# Patient Record
Sex: FEMALE | Race: WHITE | Employment: FULL TIME | URBAN - METROPOLITAN AREA
[De-identification: names, ages, dates, MRNs, and addresses within clinical notes are randomized per-mention and may not be internally consistent; named-entity substitution may affect disease eponyms.]

---

## 2017-01-30 DIAGNOSIS — F33.1 MAJOR DEPRESSIVE DISORDER, RECURRENT EPISODE, MODERATE (H): Primary | ICD-10-CM

## 2017-01-30 RX ORDER — BUPROPION HYDROCHLORIDE 100 MG/1
100 TABLET, EXTENDED RELEASE ORAL 2 TIMES DAILY
Qty: 60 TABLET | Refills: 0 | Status: SHIPPED | OUTPATIENT
Start: 2017-01-30 | End: 2017-02-28

## 2017-01-30 NOTE — Clinical Note
[facility]                                             [address] [place]  [address]    January 30, 2017    Narcisa Quiles  [address]  [facility] [address]    Dear Narcisa,       We recently received a refill request for Buproprion abd Fluoxetine.  We have refilled this for a one time 30 day supply only because you are due for a:    Depression office visit      Please call at your earliest convenience so that there will not be a delay with your future refills.          Thank you,   Your [facility] Care Team/  [phone]

## 2017-03-03 ENCOUNTER — OFFICE VISIT (OUTPATIENT)
Dept: FAMILY MEDICINE | Facility: CLINIC | Age: 52
End: 2017-03-03
Payer: COMMERCIAL

## 2017-03-03 VITALS
BODY MASS INDEX: 38.73 KG/M2 | HEART RATE: 96 BPM | HEIGHT: 66 IN | WEIGHT: 241 LBS | DIASTOLIC BLOOD PRESSURE: 82 MMHG | SYSTOLIC BLOOD PRESSURE: 124 MMHG | OXYGEN SATURATION: 98 %

## 2017-03-03 DIAGNOSIS — Z79.890 SURGICAL MENOPAUSE ON HORMONE REPLACEMENT THERAPY: ICD-10-CM

## 2017-03-03 DIAGNOSIS — F33.1 MAJOR DEPRESSIVE DISORDER, RECURRENT EPISODE, MODERATE (H): ICD-10-CM

## 2017-03-03 DIAGNOSIS — E89.40 SURGICAL MENOPAUSE ON HORMONE REPLACEMENT THERAPY: ICD-10-CM

## 2017-03-03 DIAGNOSIS — Z23 NEED FOR VACCINATION: ICD-10-CM

## 2017-03-03 DIAGNOSIS — Z00.00 ROUTINE GENERAL MEDICAL EXAMINATION AT A HEALTH CARE FACILITY: Primary | ICD-10-CM

## 2017-03-03 DIAGNOSIS — Z90.710 ACQUIRED ABSENCE OF BOTH CERVIX AND UTERUS: ICD-10-CM

## 2017-03-03 PROCEDURE — 90715 TDAP VACCINE 7 YRS/> IM: CPT | Performed by: PHYSICIAN ASSISTANT

## 2017-03-03 PROCEDURE — 90471 IMMUNIZATION ADMIN: CPT | Performed by: PHYSICIAN ASSISTANT

## 2017-03-03 PROCEDURE — 99396 PREV VISIT EST AGE 40-64: CPT | Mod: 25 | Performed by: PHYSICIAN ASSISTANT

## 2017-03-03 RX ORDER — FLUOXETINE 10 MG/1
10 CAPSULE ORAL 2 TIMES DAILY
Qty: 180 CAPSULE | Refills: 1 | Status: SHIPPED | OUTPATIENT
Start: 2017-03-03 | End: 2017-03-03

## 2017-03-03 RX ORDER — BUPROPION HYDROCHLORIDE 100 MG/1
100 TABLET, EXTENDED RELEASE ORAL 2 TIMES DAILY
Qty: 180 TABLET | Refills: 1 | Status: SHIPPED | OUTPATIENT
Start: 2017-03-03 | End: 2017-08-08

## 2017-03-03 RX ORDER — ESTRADIOL 0.5 MG/1
0.5 TABLET ORAL DAILY
Qty: 90 TABLET | Refills: 3 | Status: SHIPPED | OUTPATIENT
Start: 2017-03-03 | End: 2017-08-08

## 2017-03-03 ASSESSMENT — ANXIETY QUESTIONNAIRES
3. WORRYING TOO MUCH ABOUT DIFFERENT THINGS: NOT AT ALL
IF YOU CHECKED OFF ANY PROBLEMS ON THIS QUESTIONNAIRE, HOW DIFFICULT HAVE THESE PROBLEMS MADE IT FOR YOU TO DO YOUR WORK, TAKE CARE OF THINGS AT HOME, OR GET ALONG WITH OTHER PEOPLE: NOT DIFFICULT AT ALL
5. BEING SO RESTLESS THAT IT IS HARD TO SIT STILL: NOT AT ALL
2. NOT BEING ABLE TO STOP OR CONTROL WORRYING: NOT AT ALL
1. FEELING NERVOUS, ANXIOUS, OR ON EDGE: NOT AT ALL
GAD7 TOTAL SCORE: 1
6. BECOMING EASILY ANNOYED OR IRRITABLE: SEVERAL DAYS
7. FEELING AFRAID AS IF SOMETHING AWFUL MIGHT HAPPEN: NOT AT ALL

## 2017-03-03 ASSESSMENT — PATIENT HEALTH QUESTIONNAIRE - PHQ9: 5. POOR APPETITE OR OVEREATING: NOT AT ALL

## 2017-03-03 NOTE — NURSING NOTE
Screening Questionnaire for Adult Immunization    Are you sick today?   No   Do you have allergies to medications, food, a vaccine component or latex?   No   Have you ever had a serious reaction after receiving a vaccination?   No   Do you have a long-term health problem with heart disease, lung disease, asthma, kidney disease, metabolic disease (e.g. diabetes), anemia, or other blood disorder?   No   Do you have cancer, leukemia, HIV/AIDS, or any other immune system problem?   No   In the past 3 months, have you taken medications that affect  your immune system, such as prednisone, other steroids, or anticancer drugs; drugs for the treatment of rheumatoid arthritis, Crohn s disease, or psoriasis; or have you had radiation treatments?   No   Have you had a seizure, or a brain or other nervous system problem?   No   During the past year, have you received a transfusion of blood or blood     products, or been given immune (gamma) globulin or antiviral drug?   No   For women: Are you pregnant or is there a chance you could become        pregnant during the next month?   No   Have you received any vaccinations in the past 4 weeks?   No     Immunization questionnaire answers were all negative.      MNVFC doesn't apply on this patient    Per orders of ADO, injection of Tdap given by Flor Thomas. Patient instructed to remain in clinic for 20 minutes afterwards, and to report any adverse reaction to me immediately.       Screening performed by Flor Thomas on 3/3/2017 at 1:42 PM.

## 2017-03-03 NOTE — MR AVS SNAPSHOT
After Visit Summary   3/3/2017    Narcisa Quiles    MRN: [id number]           Patient Information     Date Of Birth          1965        Visit Information        Provider Department      3/3/2017 1:10 PM Pedro Mohmaud PA-C [facility]        Today's Diagnoses     Routine general medical examination at a health care facility    -  1    Major depressive disorder, recurrent episode, moderate (H)        Acquired absence of both cervix and uterus        Surgical menopause on hormone replacement therapy          Care Instructions      Preventive Health Recommendations  Female Ages 50 - 64    Yearly exam: See your health care provider every year in order to  o Review health changes.   o Discuss preventive care.    o Review your medicines if your doctor has prescribed any.      Get a Pap test every three years (unless you have an abnormal result and your provider advises testing more often).    If you get Pap tests with HPV test, you only need to test every 5 years, unless you have an abnormal result.     You do not need a Pap test if your uterus was removed (hysterectomy) and you have not had cancer.    You should be tested each year for STDs (sexually transmitted diseases) if you're at risk.     Have a mammogram every 1 to 2 years.    Have a colonoscopy at age 50, or have a yearly FIT test (stool test). These exams screen for colon cancer.      Have a cholesterol test every 5 years, or more often if advised.    Have a diabetes test (fasting glucose) every three years. If you are at risk for diabetes, you should have this test more often.     If you are at risk for osteoporosis (brittle bone disease), think about having a bone density scan (DEXA).    Shots: Get a flu shot each year. Get a tetanus shot every 10 years.    Nutrition:     Eat at least 5 servings of fruits and vegetables each day.    Eat whole-grain bread, whole-wheat pasta and brown rice instead of white grains and  rice.    Talk to your provider about Calcium and Vitamin D.     Lifestyle    Exercise at least 150 minutes a week (30 minutes a day, 5 days a week). This will help you control your weight and prevent disease.    Limit alcohol to one drink per day.    No smoking.     Wear sunscreen to prevent skin cancer.     See your dentist every six months for an exam and cleaning.    See your eye doctor every 1 to 2 years.          Follow-ups after your visit        Future tests that were ordered for you today     Open Future Orders        Priority Expected Expires Ordered    Lipid panel reflex to direct LDL Routine  9/4/2017 3/3/2017    Basic metabolic panel Routine  9/4/2017 3/3/2017            Who to contact     If you have questions or need follow up information about today's clinic visit or your schedule please contact [facility] directly at [phone].  Normal or non-critical lab and imaging results will be communicated to you by Socialscopehart, letter or phone within 4 business days after the clinic has received the results. If you do not hear from us within 7 days, please contact the clinic through Socialscopehart or phone. If you have a critical or abnormal lab result, we will notify you by phone as soon as possible.  Submit refill requests through QR Artist or call your pharmacy and they will forward the refill request to us. Please allow 3 business days for your refill to be completed.          Additional Information About Your Visit        QR Artist Information     QR Artist gives you secure access to your electronic health record. If you see a primary care provider, you can also send messages to your care team and make appointments. If you have questions, please call your primary care clinic.  If you do not have a primary care provider, please call [phone] and they will assist you.        Care EveryWhere ID     This is your Care EveryWhere ID. This could be used by other organizations to access your [facility]  "records  [id number]        Your Vitals Were     Pulse Height Pulse Oximetry BMI (Body Mass Index)          96 5' 5.5\" (1.664 m) 98% 39.49 kg/m2         Blood Pressure from Last 3 Encounters:   03/03/17 124/82   08/12/16 110/80   08/03/16 108/74    Weight from Last 3 Encounters:   03/03/17 241 lb (109.3 kg)   10/12/16 238 lb (108 kg)   09/23/16 243 lb (110.2 kg)                 Today's Medication Changes          These changes are accurate as of: 3/3/17  1:39 PM.  If you have any questions, ask your nurse or doctor.               These medicines have changed or have updated prescriptions.        Dose/Directions    FLUoxetine 10 MG capsule   Commonly known as:  PROzac   This may have changed:    - medication strength  - how much to take  - when to take this   Used for:  Major depressive disorder, recurrent episode, moderate (H)   Changed by:  Pedro Mohamud PA-C        Dose:  10 mg   Take 1 capsule (10 mg) by mouth 2 times daily   Quantity:  180 capsule   Refills:  1            Where to get your medicines      These medications were sent to [facility] [address]  [address] [place] [address]     Phone:  [phone]     buPROPion 100 MG 12 hr tablet    estradiol 0.5 MG tablet    FLUoxetine 10 MG capsule                Primary Care Provider Office Phone # Fax #    [facility] [phone] [address] [facility]. [facility] [address]        Thank you!     Thank you for choosing [facility]  for your care. Our goal is always to provide you with excellent care. Hearing back from our patients is one way we can continue to improve our services. Please take a few minutes to complete the written survey that you may receive in the mail after your visit with us. Thank you!             Your Updated Medication List - Protect others around you: Learn how to safely use, store and throw away your medicines at " www.disposemymeds.org.          This list is accurate as of: 3/3/17  1:39 PM.  Always use your most recent med list.                   Brand Name Dispense Instructions for use    buPROPion 100 MG 12 hr tablet    WELLBUTRIN SR    180 tablet    Take 1 tablet (100 mg) by mouth 2 times daily       estradiol 0.5 MG tablet    ESTRACE    90 tablet    Take 1 tablet (0.5 mg) by mouth daily       FLUoxetine 10 MG capsule    PROzac    180 capsule    Take 1 capsule (10 mg) by mouth 2 times daily

## 2017-03-03 NOTE — PROGRESS NOTES
"   SUBJECTIVE:     CC: Narcisa Quiles is an 52 year old woman who presents for preventive health visit.     Healthy Habits:    Do you get at least three servings of calcium containing foods daily (dairy, green leafy vegetables, etc.)? {YES/NO, DAIRY INTAKE:880357::\"yes\"}    Amount of exercise or daily activities, outside of work: {AMOUNT EXERCISE:575782}    Problems taking medications regularly {Yes /No default:895293::\"No\"}    Medication side effects: {Yes /No default.:568355::\"No\"}    Have you had an eye exam in the past two years? {YESNOBLANK:215494}    Do you see a dentist twice per year? {YESNOBLANK:974637}    Do you have sleep apnea, excessive snoring or daytime drowsiness?{YESNOBLANK:473816}    {Outside tests to abstract? :470362}    {additional problems to add:433150}    Today's PHQ-2 Score:   PHQ-2 ( 1999 Pfizer) 2/28/2017 12/14/2015   Q1: Little interest or pleasure in doing things - 0   Q2: Feeling down, depressed or hopeless - 0   PHQ-2 Score - 0   Little interest or pleasure in doing things Not at all -   Feeling down, depressed or hopeless Not at all -   PHQ-2 Score 0 -     {PHQ-2 LOOK IN ASSESSMENTS :794968}  Abuse: Current or Past(Physical, Sexual or Emotional)- {YES/NO/NA:511286}  Do you feel safe in your environment - {YES/NO/NA:844163}    Social History   Substance Use Topics     Smoking status: Former Smoker     Types: Cigarettes     Start date: 6/6/2009     Smokeless tobacco: Never Used     Alcohol use Yes      Comment: Occasional     {ETOH AUDIT:215678}    Recent Labs   Lab Test  06/16/15   1725  06/06/14   1259   CHOL  207*  195   HDL  84  68   LDL  106  104   TRIG  87  118   CHOLHDLRATIO  2.5  2.9       Reviewed orders with patient.  Reviewed health maintenance and updated orders accordingly - {Yes/No:831608::\"Yes\"}    Mammo Decision Support:  {Mammo:273581}    Pertinent mammograms are reviewed under the imaging tab.  History of abnormal Pap smear: {PAP HX:924949}    Reviewed and updated " "as needed this visit by clinical staff         Reviewed and updated as needed this visit by Provider        {HISTORY OPTIONS:076518}    ROS:  {FEMALE PREVENTATIVE ROS:058544}    {CHRONICPROBDATA:969001}  OBJECTIVE:     There were no vitals taken for this visit.  EXAM:  {Exam Choices:005198}    ASSESSMENT/PLAN:     {Diag Picklist:511785}    COUNSELING:   {FEMALE COUNSELING MESSAGES:328458::\"Reviewed preventive health counseling, as reflected in patient instructions\"}    {Blood Pressure Screenin}     reports that she has quit smoking. Her smoking use included Cigarettes. She started smoking about 7 years ago. She has never used smokeless tobacco.  {Tobacco Cessation needed for ACO -- Delete if patient is a non-smoker:191633}  Estimated body mass index is 39 kg/(m^2) as calculated from the following:    Height as of 10/12/16: 5' 5.5\" (1.664 m).    Weight as of 10/12/16: 238 lb (108 kg).   {Weight Management Plan needed for ACO:042784}    Counseling Resources:  ATP IV Guidelines  Pooled Cohorts Equation Calculator  Breast Cancer Risk Calculator  FRAX Risk Assessment  ICSI Preventive Guidelines  Dietary Guidelines for Americans,   USDA's MyPlate  ASA Prophylaxis  Lung CA Screening    Pedro Mohamud PA-C  [employer]  "

## 2017-03-03 NOTE — NURSING NOTE
"Chief Complaint   Patient presents with     Physical       Initial /82  Pulse 96  Ht 5' 5.5\" (1.664 m)  Wt 241 lb (109.3 kg)  SpO2 98%  BMI 39.49 kg/m2 Estimated body mass index is 39.49 kg/(m^2) as calculated from the following:    Height as of this encounter: 5' 5.5\" (1.664 m).    Weight as of this encounter: 241 lb (109.3 kg).  Medication Reconciliation: complete  Flor Corley CMA    "

## 2017-03-03 NOTE — PROGRESS NOTES
SUBJECTIVE:     CC: Narcisa Quiles is an 52 year old woman who presents for preventive health visit.     Physical   Annual:     Getting at least 3 servings of Calcium per day::  Yes    Bi-annual eye exam::  NO    Dental care twice a year::  Yes    Sleep apnea or symptoms of sleep apnea::  None    Diet::  Regular (no restrictions)    Frequency of exercise::  1 day/week    Duration of exercise::  Less than 15 minutes    Taking medications regularly::  Yes    Medication side effects::  Not applicable    Additional concerns today::  No      Depression Followup    Status since last visit: Stable - wants to ween off prozac. Was on 40 now down to 20mg    See PHQ-9 for current symptoms.  Other associated symptoms: None    Complicating factors:   Significant life event:  No   Current substance abuse:  None  Anxiety or Panic symptoms:  No    PHQ-9  English PHQ-9   Any Language            Amount of exercise or physical activity: None    Problems taking medications regularly: No    Medication side effects: none  Diet: regular (no restrictions)     Today's PHQ-2 Score:   PHQ-2 ( 1999 Pfizer) 3/3/2017   Q1: Little interest or pleasure in doing things 0   Q2: Feeling down, depressed or hopeless 0   PHQ-2 Score 0   Little interest or pleasure in doing things -   Feeling down, depressed or hopeless -   PHQ-2 Score -       Abuse: Current or Past(Physical, Sexual or Emotional)- No  Do you feel safe in your environment - Yes    Social History   Substance Use Topics     Smoking status: Former Smoker     Types: Cigarettes     Start date: 6/6/2009     Smokeless tobacco: Never Used     Alcohol use Yes      Comment: Occasional     The patient does not drink >3 drinks per day nor >7 drinks per week.    Recent Labs   Lab Test  06/16/15   1725  06/06/14   1259   CHOL  207*  195   HDL  84  68   LDL  106  104   TRIG  87  118   CHOLHDLRATIO  2.5  2.9       Reviewed orders with patient.  Reviewed health maintenance and updated orders  accordingly - Yes    Mammo Decision Support:  updated    Pertinent mammograms are reviewed under the imaging tab.  History of abnormal Pap smear: Status post benign hysterectomy. Health Maintenance and Surgical History updated.    Reviewed and updated as needed this visit by clinical staff  Tobacco  Allergies  Meds  Problems  Med Hx  Surg Hx  Fam Hx  Soc Hx          Reviewed and updated as needed this visit by Provider  Allergies  Meds  Problems  Surg Hx          History reviewed. No pertinent past medical history.   Past Surgical History   Procedure Laterality Date     Hysterectomy vaginal, bilateral salpingo-oopherectomy, combined         ROS:  C: NEGATIVE for fever, chills, change in weight  I: NEGATIVE for worrisome rashes, moles or lesions  E: NEGATIVE for vision changes or irritation  ENT: NEGATIVE for ear, mouth and throat problems  R: NEGATIVE for significant cough or SOB  B: NEGATIVE for masses, tenderness or discharge  CV: NEGATIVE for chest pain, palpitations or peripheral edema  GI: NEGATIVE for nausea, abdominal pain, heartburn, or change in bowel habits  : NEGATIVE for unusual urinary or vaginal symptoms. No vaginal bleeding.  M: NEGATIVE for significant arthralgias or myalgia  N: NEGATIVE for weakness, dizziness or paresthesias  P: NEGATIVE for changes in mood or affect     Problem list, Medication list, Allergies, and Medical/Social/Surgical histories reviewed in [facility] and updated as appropriate.  Labs reviewed in EPIC  BP Readings from Last 3 Encounters:   03/03/17 124/82   08/12/16 110/80   08/03/16 108/74    Wt Readings from Last 3 Encounters:   03/03/17 241 lb (109.3 kg)   10/12/16 238 lb (108 kg)   09/23/16 243 lb (110.2 kg)                  Patient Active Problem List   Diagnosis     Hyperlipidemia with target LDL less than 130     Major depression     Major depressive disorder, recurrent episode, moderate (H)     Other iron deficiency anemia     History of total left knee  "replacement     Past Surgical History   Procedure Laterality Date     Hysterectomy vaginal, bilateral salpingo-oopherectomy, combined         Social History   Substance Use Topics     Smoking status: Former Smoker     Types: Cigarettes     Start date: 6/6/2009     Smokeless tobacco: Never Used     Alcohol use Yes      Comment: Occasional     Family History   Problem Relation Age of Onset     Thyroid Disease Mother      CANCER Mother      Hypertension Father      HEART DISEASE Father      Hypertension Maternal Grandmother      Alzheimer Disease Maternal Grandmother      Prostate Cancer Maternal Grandfather      HEART DISEASE Maternal Grandfather      CANCER Maternal Grandfather      Alzheimer Disease Paternal Grandmother      Hypertension Sister      Depression Daughter          Current Outpatient Prescriptions   Medication Sig Dispense Refill     buPROPion (WELLBUTRIN SR) 100 MG 12 hr tablet Take 1 tablet (100 mg) by mouth 2 times daily 180 tablet 1     estradiol (ESTRACE) 0.5 MG tablet Take 1 tablet (0.5 mg) by mouth daily 90 tablet 3     FLUoxetine (PROZAC) 20 MG capsule Take 1 capsule (20 mg) by mouth daily 90 capsule 1     [DISCONTINUED] FLUoxetine (PROZAC) 10 MG capsule Take 1 capsule (10 mg) by mouth 2 times daily 180 capsule 1     [DISCONTINUED] buPROPion (WELLBUTRIN SR) 100 MG 12 hr tablet Take 1 tablet (100 mg) by mouth 2 times daily 60 tablet 0     [DISCONTINUED] FLUoxetine (PROZAC) 20 MG capsule Take 1 capsule (20 mg) by mouth daily 30 capsule 0     [DISCONTINUED] estradiol (ESTRACE) 0.5 MG tablet Take 1 tablet (0.5 mg) by mouth daily 90 tablet 3     [DISCONTINUED] buPROPion (WELLBUTRIN SR) 150 MG 12 hr tablet Take 1 tablet (150 mg) by mouth daily In AM for energy/depression/ weight loss 30 tablet 2     No Known Allergies  OBJECTIVE:     /82  Pulse 96  Ht 5' 5.5\" (1.664 m)  Wt 241 lb (109.3 kg)  SpO2 98%  BMI 39.49 kg/m2  EXAM:  GENERAL: healthy, alert and no distress  EYES: Eyes grossly normal to " inspection, PERRL and conjunctivae and sclerae normal  HENT: ear canals and TM's normal, nose and mouth without ulcers or lesions  NECK: no adenopathy, no asymmetry, masses, or scars and thyroid normal to palpation  RESP: lungs clear to auscultation - no rales, rhonchi or wheezes  BREAST: deferred  CV: regular rate and rhythm, normal S1 S2, no S3 or S4, no murmur, click or rub, no peripheral edema and peripheral pulses strong  ABDOMEN: soft, nontender, no hepatosplenomegaly, no masses and bowel sounds normal   (female): deferred  MS: no gross musculoskeletal defects noted, no edema  SKIN: no suspicious lesions or rashes  NEURO: Normal strength and tone, mentation intact and speech normal  PSYCH: mentation appears normal, affect normal/bright    ASSESSMENT/PLAN:         ICD-10-CM    1. Routine general medical examination at a health care facility Z00.00 Lipid panel reflex to direct LDL     Basic metabolic panel   2. Major depressive disorder, recurrent episode, moderate (H) F33.1 buPROPion (WELLBUTRIN SR) 100 MG 12 hr tablet     FLUoxetine (PROZAC) 20 MG capsule     DISCONTINUED: FLUoxetine (PROZAC) 10 MG capsule   3. Acquired absence of both cervix and uterus Z90.710 estradiol (ESTRACE) 0.5 MG tablet   4. Surgical menopause on hormone replacement therapy E89.40 estradiol (ESTRACE) 0.5 MG tablet   5. Need for vaccination Z23 TDAP (ADACEL AGES 11-64) [09989.002]     1st  Administration  [87698]     Discussed tapering of prozac. Taking meds every other day.   Recheck via e-visit   warning signs discussed.  side effects discussed  Future fasting labs pending.     COUNSELING:  Reviewed preventive health counseling, as reflected in patient instructions       Regular exercise       Healthy diet/nutrition    BP Screening:   Last 3 BP Readings:    BP Readings from Last 3 Encounters:   03/03/17 124/82   08/12/16 110/80   08/03/16 108/74       The following was recommended to the patient:  Re-screen BP within a year and  "recommended lifestyle modifications     reports that she has quit smoking. Her smoking use included Cigarettes. She started smoking about 7 years ago. She has never used smokeless tobacco.     Estimated body mass index is 39.49 kg/(m^2) as calculated from the following:    Height as of this encounter: 5' 5.5\" (1.664 m).    Weight as of this encounter: 241 lb (109.3 kg).   Weight management plan: Discussed healthy diet and exercise guidelines and patient will follow up in 12 months in clinic to re-evaluate.    Counseling Resources:  ATP IV Guidelines  Pooled Cohorts Equation Calculator  Breast Cancer Risk Calculator  FRAX Risk Assessment  ICSI Preventive Guidelines  Dietary Guidelines for Americans, 2010  USDA's MyPlate  ASA Prophylaxis  Lung CA Screening    Pedro Mohamud PA-C  [facility]  "

## 2017-03-04 ASSESSMENT — PATIENT HEALTH QUESTIONNAIRE - PHQ9: SUM OF ALL RESPONSES TO PHQ QUESTIONS 1-9: 2

## 2017-03-04 ASSESSMENT — ANXIETY QUESTIONNAIRES: GAD7 TOTAL SCORE: 1

## 2017-04-10 ENCOUNTER — OFFICE VISIT (OUTPATIENT)
Dept: OPTOMETRY | Facility: CLINIC | Age: 52
End: 2017-04-10
Payer: COMMERCIAL

## 2017-04-10 DIAGNOSIS — H52.4 PRESBYOPIA: ICD-10-CM

## 2017-04-10 DIAGNOSIS — H52.13 MYOPIA OF BOTH EYES: Primary | ICD-10-CM

## 2017-04-10 PROCEDURE — 92015 DETERMINE REFRACTIVE STATE: CPT | Performed by: OPTOMETRIST

## 2017-04-10 PROCEDURE — 92004 COMPRE OPH EXAM NEW PT 1/>: CPT | Performed by: OPTOMETRIST

## 2017-04-10 ASSESSMENT — VISUAL ACUITY
OS_CC: 20/30
OD_SC+: -1
METHOD: SNELLEN - LINEAR
OD_CC: 20/20
OD_CC: 20/50+2
OS_SC: 20/70
CORRECTION_TYPE: GLASSES
OD_CC+: -2
OD_SC: 20/25
OS_SC+: -1
OS_CC: 20/20

## 2017-04-10 ASSESSMENT — REFRACTION_MANIFEST
OS_ADD: +2.50
OD_SPHERE: -0.75
METHOD_AUTOREFRACTION: 1
OD_SPHERE: -0.50
OD_AXIS: 067
OD_ADD: +2.50
OS_SPHERE: -1.50
OS_CYLINDER: SPHERE
OD_CYLINDER: +0.50
OD_CYLINDER: SPHERE
OS_SPHERE: -1.25
OS_CYLINDER: SPHERE

## 2017-04-10 ASSESSMENT — SLIT LAMP EXAM - LIDS: COMMENTS: NORMAL

## 2017-04-10 ASSESSMENT — REFRACTION_WEARINGRX
OS_ADD: +2.25
OS_SPHERE: -1.25
OS_CYLINDER: SPHERE
OD_AXIS: 105
OD_SPHERE: -1.00
SPECS_TYPE: PAL
OD_ADD: +2.25
OD_CYLINDER: +0.25

## 2017-04-10 ASSESSMENT — EXTERNAL EXAM - RIGHT EYE: OD_EXAM: NORMAL

## 2017-04-10 ASSESSMENT — CONF VISUAL FIELD
OS_NORMAL: 1
OD_NORMAL: 1

## 2017-04-10 ASSESSMENT — TONOMETRY
OD_IOP_MMHG: 18
OS_IOP_MMHG: 18
IOP_METHOD: APPLANATION

## 2017-04-10 ASSESSMENT — CUP TO DISC RATIO
OS_RATIO: 0.3
OD_RATIO: 0.3

## 2017-04-10 ASSESSMENT — EXTERNAL EXAM - LEFT EYE: OS_EXAM: NORMAL

## 2017-04-10 NOTE — PATIENT INSTRUCTIONS
Patient was advised of today's exam findings.  Fill glasses prescription  Allow 2 weeks to adapt to change in glasses  Return in 1-2 years for eye exam    Katarina Roman O.D.  [facility]   [address] [place] [address] [phone]

## 2017-04-10 NOTE — MR AVS SNAPSHOT
After Visit Summary   4/10/2017    Narcisa Quiles    MRN: [id number]           Patient Information     Date Of Birth          1965        Visit Information        Provider Department      4/10/2017 2:00 PM Katarina Roman, CHRISTINA [facility]        Today's Diagnoses     Myopia of both eyes    -  1    Presbyopia          Care Instructions    Patient was advised of today's exam findings.  Fill glasses prescription  Allow 2 weeks to adapt to change in glasses  Return in 1-2 years for eye exam    Katarina Roman O.D.  [facility]   [address] [place] [address]  [phone]          Follow-ups after your visit        Who to contact     If you have questions or need follow up information about today's clinic visit or your schedule please contact [facility] directly at [phone].  Normal or non-critical lab and imaging results will be communicated to you by MyChart, letter or phone within 4 business days after the clinic has received the results. If you do not hear from us within 7 days, please contact the clinic through [employer] or phone. If you have a critical or abnormal lab result, we will notify you by phone as soon as possible.  Submit refill requests through [employer] or call your pharmacy and they will forward the refill request to us. Please allow 3 business days for your refill to be completed.          Additional Information About Your Visit        MyChart Information     [employer] gives you secure access to your electronic health record. If you see a primary care provider, you can also send messages to your care team and make appointments. If you have questions, please call your primary care clinic.  If you do not have a primary care provider, please call [phone] and they will assist you.        Care EveryWhere ID     This is your Care EveryWhere ID. This could be used by other organizations to access your [facility]  records  [id number]         Blood Pressure from Last 3 Encounters:   03/03/17 124/82   08/12/16 110/80   08/03/16 108/74    Weight from Last 3 Encounters:   03/03/17 109.3 kg (241 lb)   10/12/16 108 kg (238 lb)   09/23/16 110.2 kg (243 lb)              We Performed the Following     EYE EXAM (SIMPLE-NONBILLABLE)     REFRACTION        Primary Care Provider Office Phone # Fax #    [facility] [phone] [address] [facility] [address]        Thank you!     Thank you for choosing [facility]  for your care. Our goal is always to provide you with excellent care. Hearing back from our patients is one way we can continue to improve our services. Please take a few minutes to complete the written survey that you may receive in the mail after your visit with us. Thank you!             Your Updated Medication List - Protect others around you: Learn how to safely use, store and throw away your medicines at www.disposemymeds.org.          This list is accurate as of: 4/10/17  2:50 PM.  Always use your most recent med list.                   Brand Name Dispense Instructions for use    buPROPion 100 MG 12 hr tablet    WELLBUTRIN SR    180 tablet    Take 1 tablet (100 mg) by mouth 2 times daily       estradiol 0.5 MG tablet    ESTRACE    90 tablet    Take 1 tablet (0.5 mg) by mouth daily       FLUoxetine 20 MG capsule    PROzac    90 capsule    Take 1 capsule (20 mg) by mouth daily

## 2017-04-10 NOTE — PROGRESS NOTES
Chief Complaint   Patient presents with     COMPREHENSIVE EYE EXAM         Last Eye Exam: 2012  Dilated Previously: Yes    What are you currently using to see?  glasses       Distance Vision Acuity: Satisfied with vision    Near Vision Acuity: Satisfied with vision while reading  with readers and with glasses    Eye Comfort: dry with  Computer  Do you use eye drops? : No  Occupation or Hobbies: Computer Work    Lisbeth EDOUARD  Opt. Tech.  4/10/2017          Medical, surgical and family histories reviewed and updated 4/10/2017.     Hit by branch while cutting brush this weekend- caused bruised upper lid    OBJECTIVE: See Ophthalmology exam    ASSESSMENT:    ICD-10-CM    1. Myopia of both eyes H52.13 EYE EXAM (SIMPLE-NONBILLABLE)     REFRACTION   2. Presbyopia H52.4 EYE EXAM (SIMPLE-NONBILLABLE)     REFRACTION      PLAN:     Patient Instructions   Patient was advised of today's exam findings.  Fill glasses prescription  Allow 2 weeks to adapt to change in glasses  Return in 1-2 years for eye exam    Katarina Roman O.D.  [facility]   [address] [place] [address]  [phone]

## 2017-05-12 ENCOUNTER — OFFICE VISIT (OUTPATIENT)
Dept: ORTHOPEDICS | Facility: CLINIC | Age: 52
End: 2017-05-12
Payer: COMMERCIAL

## 2017-05-12 VITALS
DIASTOLIC BLOOD PRESSURE: 96 MMHG | WEIGHT: 241 LBS | BODY MASS INDEX: 38.73 KG/M2 | SYSTOLIC BLOOD PRESSURE: 143 MMHG | HEART RATE: 82 BPM | HEIGHT: 66 IN | RESPIRATION RATE: 16 BRPM

## 2017-05-12 DIAGNOSIS — M17.11 PRIMARY OSTEOARTHRITIS OF RIGHT KNEE: Primary | ICD-10-CM

## 2017-05-12 PROCEDURE — 20610 DRAIN/INJ JOINT/BURSA W/O US: CPT | Mod: RT | Performed by: ORTHOPAEDIC SURGERY

## 2017-05-12 RX ORDER — METHYLPREDNISOLONE ACETATE 80 MG/ML
80 INJECTION, SUSPENSION INTRA-ARTICULAR; INTRALESIONAL; INTRAMUSCULAR; SOFT TISSUE ONCE
Qty: 5 ML | Refills: 0 | OUTPATIENT
Start: 2017-05-12 | End: 2017-05-12

## 2017-05-12 ASSESSMENT — PAIN SCALES - GENERAL: PAINLEVEL: MODERATE PAIN (5)

## 2017-05-12 NOTE — NURSING NOTE
"Chief Complaint   Patient presents with     RECHECK     Right knee pain. Last cortisone injection: 9/23/16. Injection lasted up until about a week ago. She would like another injection today.        Initial BP (!) 143/96  Pulse 82  Resp 16  Ht 1.664 m (5' 5.5\")  Wt 109.3 kg (241 lb)  BMI 39.49 kg/m2 Estimated body mass index is 39.49 kg/(m^2) as calculated from the following:    Height as of this encounter: 1.664 m (5' 5.5\").    Weight as of this encounter: 109.3 kg (241 lb).  Medication Reconciliation: giorgi Ely Certified Medical Assistant    "

## 2017-05-12 NOTE — PATIENT INSTRUCTIONS
Please remember to call and schedule a follow up appointment if one was recommended at your earliest convenience.  Orthopedics CLINIC HOURS TELEPHONE NUMBER   Dr. Laura Carrillo  Certified Medical Assistant   Monday & Wednesday   8am - 5pm  Thursday 1pm - 5pm  Friday 8am -11:30am Specialty schedulers:   [phone] to schedule your surgery.  Main Clinic:   [phone] to make an appointment with any provider.    Urgent Care locations:    [facility] Monday-Friday Closed  Saturday-Sunday 9am-5pm      Monday-Friday 12pm - 8pm  Saturday-Sunday 9am-5pm [phone]     If SURGERY has been recommended, please call our Specialty Schedulers at [phone] to schedule your procedure.    If you need a medication refill, please contact your pharmacy. Please allow 3 business days for your refill to be completed.    If an MRI or CT scan has been recommended, please call [facility] Schedulers at [phone] to schedule your appointment.  Use [employer] (secure e-mail communication and access to your chart) to send a message or to make an appointment. Please ask how you can sign up for [employer].  Your care team's suggested websites for health information:   [email or web address] : Up to date and easily searchable information on multiple topics.   [email or web address] : [place] dept of heat, public health issues in [place], N1N1

## 2017-05-12 NOTE — PROGRESS NOTES
CHIEF COMPLAINT:   Chief Complaint   Patient presents with     RECHECK     Right knee pain. Last cortisone injection: 9/23/16. Injection lasted up until about a week ago. She would like another injection today.        HISTORY OF PRESENT ILLNESS    Narcisa Quiles is a 52 year old female seen for followup evaluation of ongoing right knee pain, R>L, with no known injury. Pain has been present for over a year in the right knee. Returns today for a repeat injection. Last injection was on 9/23/2016. She notes this injection worked great. She started to notice the pain return about 1 week ago.     She has a history of left TKA in 2011 with Suzan with Dr. Burton. Pain occurs intermittently and is rated at a 5/10. Pain is located at the anterior aspect of the right knee and diffuse throughout the left knee. Pain is aggravated with walking.       Present symptoms: mild-moderate  pain, mild swelling.  Pain severity: 5/10 right knee.  Frequency of symptoms: frequently  Exacerbating Factors: walking  Relieving Factors: rest  Night Pain: No  Pain while at rest: No   Numbness or tingling: No   Patient has tried:     NSAIDS: No      Physical Therapy: Yes      Activity modification: Yes      Bracing: No      Injections: Yes, cortisone      Ice: No      Assistive device:  No    Orthopaedic PMH: left TKA in 2011 with Suzan with Dr. Burton.    Other PMH:   Patient Active Problem List    Diagnosis Date Noted     History of total left knee replacement 09/14/2016     Priority: Medium     Major depressive disorder, recurrent episode, moderate (H) 05/13/2016     Priority: Medium     Other iron deficiency anemia 05/13/2016     Priority: Medium     Hyperlipidemia with target LDL less than 130 06/11/2014     Priority: Medium     Major depression 06/11/2014     Priority: Medium       Surgical Hx:  has a past surgical history that includes Hysterectomy vaginal, bilateral salpingo-oopherectomy, combined. left total knee arthroplasty  "2011 with Suzan Kerr.    Medications:   Current Outpatient Prescriptions:      buPROPion (WELLBUTRIN SR) 100 MG 12 hr tablet, Take 1 tablet (100 mg) by mouth 2 times daily, Disp: 180 tablet, Rfl: 1     estradiol (ESTRACE) 0.5 MG tablet, Take 1 tablet (0.5 mg) by mouth daily, Disp: 90 tablet, Rfl: 3     FLUoxetine (PROZAC) 20 MG capsule, Take 1 capsule (20 mg) by mouth daily (Patient not taking: Reported on 5/12/2017), Disp: 90 capsule, Rfl: 1    Allergies: No Known Allergies    Social Hx: works as a dental assistant.   reports that she has quit smoking. Her smoking use included Cigarettes. She started smoking about 7 years ago. She has never used smokeless tobacco. She reports that she drinks alcohol. She reports that she does not use illicit drugs.    Family Hx: family history includes Alzheimer Disease in her maternal grandmother and paternal grandmother; CANCER in her maternal grandfather and mother; Depression in her daughter; Eye Surgery in her father; HEART DISEASE in her father and maternal grandfather; Hypertension in her father, maternal grandmother, and sister; Prostate Cancer in her maternal grandfather; Thyroid Disease in her mother. There is no history of Macular Degeneration or Retinal detachment.     This document serves as a record of the services and decisions personally performed and made by Dale Sanchez MD. It was created on his behalf by Yumiko Ceballos, a trained medical scribe. The creation of this document is based the provider's statements to the medical scribe.    Scribe Yumiko Ceballos 9:50 AM 5/12/2017     REVIEW OF SYSTEMS:   CONSTITUTIONAL:NEGATIVE for fever, chills, change in weight  INTEGUMENTARY/SKIN: NEGATIVE for worrisome rashes, moles or lesions  MUSCULOSKELETAL:See HPI above  NEURO: NEGATIVE for weakness, dizziness or paresthesias    PHYSICAL EXAM:  BP (!) 143/96  Pulse 82  Resp 16  Ht 1.664 m (5' 5.5\")  Wt 109.3 kg (241 lb)  BMI 39.49 kg/m2   GENERAL APPEARANCE: healthy, " alert, no distress  SKIN: no suspicious lesions or rashes  NEURO: Normal strength and tone, mentation intact and speech normal  PSYCH:  mentation appears normal and affect normal, not anxious  RESPIRATORY: No increased work of breathing.    BILATERAL LOWER EXTREMITIES:  Gait: normal  Alignment: neutral on left, varus on right  No gross deformities or masses.  No Quad atrophy, strength normal.  Intact sensation deep peroneal nerve, superficial peroneal nerve, med/lat tibial nerve, sural nerve, saphenous nerve  Intact EHL, EDL, TA, FHL, GS, quadriceps hamstrings and hip flexors  Toes warm and well perfused, brisk capillary refill. Palpable 2+ dp pulses.  Bilateral calf soft and nttp or squeeze.  Edema: trace    RIGHT KNEE EXAM:    Skin: intact, no ecchymosis or erythema  Squat: 40 %, limited by anterior pain.  ROM: 2 extension to 120 flexion  Tight hamstrings on straight leg raise.  Effusion: trace  Tender: medial joint line, pes bursa   NTTP lat joint line, posterior knee    MCL: stable, and non-painful at both 0 and 30 degrees knee flexion  Varus stress: stable, and non-painful at both 0 and 30 degrees knee flexion  Lachmans: neg, firm endpoint  Posterior Drawer stable  Patellofemoral joint:                Apprehension: negative              Crepitations: positive   Grind: positive      X-RAY: no new xrays today      3 views right knee from 8/3/2016 were reviewed in clinic today. On my review, no obvious fractures or dislocations. There is moderate narrowing of the medial compartment, marginal osteophytes There is bone on bone arthritis of the patellofemoral compartment, notably lateral, with subchondral cysts and sclerosis, large osteophytes.     3 view left knee from 9/14/2016 were reviewed in clinic today. Cemented total knee arthroplasty components remain in good position, no evidence for loosening.         Impression:   52 year old female with right knee pain, advanced primary osteoarthritis.      Plan:   *  "reviewed imaging studies with patient, showing arthritis. Arthritis is wearing of the cartilage due to longstanding \"wear and tear\" or can follow an injury to the joint.    Discussed typical symptoms of arthritic pain is pain aggravated with weight bearing activities, stiffness, relieved by sitting or rest. Swelling may be associated. It is common to have some grinding and popping in the knee with arthritis.    Discussed various treatments for degenerative arthrosis of the knee, including nonoperative and operative approaches. Nonoperative approaches, which are exhausted prior to operative treatment, include doing nothing and living with the pain as patient has been doing, activity modification (avoid aggravating activities), physical therapy and strengthening exercises, weight loss, anti-inflammatory medications, bracing, ambulatory aids (cane, walker) and injections. Once these have been tried and are deemed unsuccessful with a good effort, and the patient is an appropriate candidate, the next treatment would be knee arthroplasty or replacement. Depending on the location of the arthritis, knee replacement can be partial (one side of the knee affected by arthritis) or a total knee arthroplasty (all 3 compartments). The risks, perceived benefits and perioperative rehabilitation expectations of knee arthroplasty were discussed in detail. Also discussed that approximately 10% of patients that undergo knee arthroplasty are not happy following surgery and may have worse pain or no improvement in pain, contrary to their preoperative expectations.    At this time, nonoperative treatment will be pursued. Physical therapy ordered for strengthening, stretching, and range of motion exercises of legs.     Non-surgical treatment for knee arthritis includes:    * rest  * Activity modification - avoid impact activities or activities that aggravate symptoms.  * NSAIDS (non-steroidal anti-inflammatory medications; e.g. Aleve, " "advil, motrin, ibuprofen) - regular use for inflammation ( twice daily or three times daily), with food, as long as no contra-indications Please discuss with primary care doctor if needed  * ice, 15-20 minutes at a time several times a day or as needed.  * Strengthening of quadriceps muscles  * Physical Therapy for strengthening, stretching and range of motion exercises of legs  * Tylenol as needed for pain, consider Tylenol arthritis or similar  * Weight loss: Weight loss:  Body mass index is 39.49 kg/(m^2).. weight loss benefits, not only for the current pain symptoms, but also overall health. Recommend a good diet plan that works for the patient, with the assistance of a dietician or primary care doctor as needed. Also, a good, low-impact exercise program for at least 20 minutes per day, 3 times per week, such as exercise bike, elliptical , or pool.  * Exercise: low impact such as stationary bike, elliptical, pool.  * Injections: cortisone versus viscosupplementation (hyaluronic acid, \"rooster comb\", \"gel shots\"); patient elects to proceed today with right knee cortisone injection.  * Bracing: bracing the knee may offer some relief of symptoms when worn and provide some stability.  * over the counter supplements such as glucosamine and chondroitin sulfate may help with joint pain.  * topical ointments may help as well    PROCEDURE NOTE:  The risks, perceived benefits and potential complications (including but not limited to: bleeding, infection, pain, scar, damage to adjacent structures, atrophy or necrosis of soft tissue, skin blanching, failure to relieve symptoms, worsening of symptoms, allergic reaction) of injection were discussed with the patient. Questions were addressed and answered.The patient elected to proceed. Written informed consent was obtained. The correct procedural site was identified and confirmed. A RIGHT knee intraarticular injection was performed using 1mL Depo Medrol 80mg per mL and " 7mL (4mL 1% lidocaine, 3mL 0.25% marcaine)  of local anesthetic after sterile prep, to the correct procedural site. Sterile bandaid applied. This was tolerated well by the patient. No apparent complications. Did also discuss that if diabetic, recommend close monitoring of blood sugars over the next week as cortisone injections can temporarily elevate blood sugars.    The information in this document, created by a scribe for me, accurately reflects the services I personally performed and the decisions made by me. I have reviewed and approved this document for accuracy.      Dale Sanchez M.D., M.S.  Dept. of Orthopaedic Surgery  [facility]

## 2017-05-12 NOTE — MR AVS SNAPSHOT
After Visit Summary   5/12/2017    Narcisa Quiles    MRN: [id number]           Patient Information     Date Of Birth          1965        Visit Information        Provider Department      5/12/2017 10:00 AM Dale Sanchez MD [facility] [place]        Today's Diagnoses     Primary osteoarthritis of right knee    -  1      Care Instructions    Please remember to call and schedule a follow up appointment if one was recommended at your earliest convenience.  Orthopedics CLINIC HOURS TELEPHONE NUMBER   Dr. Laura Carrillo  Certified Medical Assistant   Monday & Wednesday   8am - 5pm  Thursday 1pm - 5pm  Friday 8am -11:30am Specialty schedulers:   [phone] to schedule your surgery.  Main Clinic:   [phone] to make an appointment with any provider.    Urgent Care locations:    [facility] Monday-Friday Closed  Saturday-Sunday 9am-5pm      Monday-Friday 12pm - 8pm  Saturday-Sunday 9am-5pm [phone]     If SURGERY has been recommended, please call our Specialty Schedulers at [phone] to schedule your procedure.    If you need a medication refill, please contact your pharmacy. Please allow 3 business days for your refill to be completed.    If an MRI or CT scan has been recommended, please call [facility] Schedulers at [phone] to schedule your appointment.  Use [employer] (secure e-mail communication and access to your chart) to send a message or to make an appointment. Please ask how you can sign up for [employer].  Your care team's suggested websites for health information:   [email or web address] : Up to date and easily searchable information on multiple topics.   [email or web address] : [place] dept of heat, public health issues in [place], N1N1            Follow-ups after your visit        Follow-up notes from your care team     Return if symptoms worsen or fail to improve.      Who to contact     If you have  "questions or need follow up information about today's clinic visit or your schedule please contact [facility] directly at [phone].  Normal or non-critical lab and imaging results will be communicated to you by [employer], letter or phone within 4 business days after the clinic has received the results. If you do not hear from us within 7 days, please contact the clinic through [employer] or phone. If you have a critical or abnormal lab result, we will notify you by phone as soon as possible.  Submit refill requests through [employer] or call your pharmacy and they will forward the refill request to us. Please allow 3 business days for your refill to be completed.          Additional Information About Your Visit        [employer] Information     [employer] gives you secure access to your electronic health record. If you see a primary care provider, you can also send messages to your care team and make appointments. If you have questions, please call your primary care clinic.  If you do not have a primary care provider, please call [phone] and they will assist you.        Care EveryWhere ID     This is your Care EveryWhere ID. This could be used by other organizations to access your [place] medical records  [id number]        Your Vitals Were     Pulse Respirations Height BMI (Body Mass Index)          82 16 5' 5.5\" (1.664 m) 39.49 kg/m2         Blood Pressure from Last 3 Encounters:   05/12/17 (!) 143/96   03/03/17 124/82   08/12/16 110/80    Weight from Last 3 Encounters:   05/12/17 241 lb (109.3 kg)   03/03/17 241 lb (109.3 kg)   10/12/16 238 lb (108 kg)              We Performed the Following     DRAIN/INJECT LARGE JOINT/BURSA     METHYLPREDNISOLONE 80 MG INJ          Today's Medication Changes          These changes are accurate as of: 5/12/17 10:26 PM.  If you have any questions, ask your nurse or doctor.               Start taking these medicines.        Dose/Directions    methylPREDNISolone acetate " 80 MG/ML injection   Commonly known as:  DEPO-MEDROL   Used for:  Primary osteoarthritis of right knee   Started by:  Dale Sanchez MD        Dose:  80 mg   1 mL (80 mg) by INTRA-ARTICULAR route once for 1 dose   Quantity:  5 mL   Refills:  0            Where to get your medicines      Some of these will need a paper prescription and others can be bought over the counter.  Ask your nurse if you have questions.     You don't need a prescription for these medications     methylPREDNISolone acetate 80 MG/ML injection                Primary Care Provider Office Phone # Fax #    [facility] [phone] [address] [facility] [address]        Thank you!     Thank you for choosing [facility]  for your care. Our goal is always to provide you with excellent care. Hearing back from our patients is one way we can continue to improve our services. Please take a few minutes to complete the written survey that you may receive in the mail after your visit with us. Thank you!             Your Updated Medication List - Protect others around you: Learn how to safely use, store and throw away your medicines at www.disposemymeds.org.          This list is accurate as of: 5/12/17 10:26 PM.  Always use your most recent med list.                   Brand Name Dispense Instructions for use    buPROPion 100 MG 12 hr tablet    WELLBUTRIN SR    180 tablet    Take 1 tablet (100 mg) by mouth 2 times daily       estradiol 0.5 MG tablet    ESTRACE    90 tablet    Take 1 tablet (0.5 mg) by mouth daily       FLUoxetine 20 MG capsule    PROzac    90 capsule    Take 1 capsule (20 mg) by mouth daily       methylPREDNISolone acetate 80 MG/ML injection    DEPO-MEDROL    5 mL    1 mL (80 mg) by INTRA-ARTICULAR route once for 1 dose

## 2017-05-12 NOTE — PROGRESS NOTES
The patient's right knee was prepped with betadine solution after verification of allergies. Area approximately 10 cm x 10 cm prepped in a sterile fashion. After injection, betadine removed with soap and water and band-aids applied.    4cc Lidocaine 1%  NDC 6210-5471-55, LOT -[id number],  2019  3cc Bupivacaine 0.25% NDC 9776-3735-05, LOT -[id number],  2017  1cc Depo Medrol NDC 7229-3301-89, LOT [id number],  2019  injected into patient's right knee by Dr.Troy Sanchez

## 2017-06-19 ENCOUNTER — OFFICE VISIT (OUTPATIENT)
Dept: ORTHOPEDICS | Facility: CLINIC | Age: 52
End: 2017-06-19
Payer: COMMERCIAL

## 2017-06-19 ENCOUNTER — TELEPHONE (OUTPATIENT)
Dept: ORTHOPEDICS | Facility: CLINIC | Age: 52
End: 2017-06-19

## 2017-06-19 ENCOUNTER — RADIANT APPOINTMENT (OUTPATIENT)
Dept: GENERAL RADIOLOGY | Facility: CLINIC | Age: 52
End: 2017-06-19
Attending: ORTHOPAEDIC SURGERY
Payer: COMMERCIAL

## 2017-06-19 VITALS — RESPIRATION RATE: 18 BRPM | HEIGHT: 66 IN | TEMPERATURE: 98.6 F | BODY MASS INDEX: 39.53 KG/M2 | WEIGHT: 246 LBS

## 2017-06-19 DIAGNOSIS — M25.561 CHRONIC PAIN OF RIGHT KNEE: ICD-10-CM

## 2017-06-19 DIAGNOSIS — M17.11 PRIMARY OSTEOARTHRITIS OF RIGHT KNEE: Primary | ICD-10-CM

## 2017-06-19 DIAGNOSIS — G89.29 CHRONIC PAIN OF RIGHT KNEE: ICD-10-CM

## 2017-06-19 PROCEDURE — 99213 OFFICE O/P EST LOW 20 MIN: CPT | Performed by: ORTHOPAEDIC SURGERY

## 2017-06-19 PROCEDURE — 73562 X-RAY EXAM OF KNEE 3: CPT | Mod: RT

## 2017-06-19 ASSESSMENT — PAIN SCALES - GENERAL: PAINLEVEL: WORST PAIN (10)

## 2017-06-19 NOTE — TELEPHONE ENCOUNTER
Reason for Call:  Other call back    Detailed comments: Patient states the injections in her knee is not helping and would now like to set an appointment for a right knee replacement as she is in too much pain. Please call to advise how she can plan this - please leave detail message per patient request.     Phone Number Patient can be reached at: Home number on file [phone] (home)    Best Time: any    Can we leave a detailed message on this number? YES    Call taken on 6/19/2017 at 12:41 PM by Wily Sarmiento

## 2017-06-19 NOTE — LETTER
[facility] [place]  [address]  [address]  [place] [address] [phone]  WORKABILITY    [facility], Dr. Dale Sanchez M.D.  [place], Lisa        6/19/2017      RE: Narcisa Quiles    [address]  [place] [address]        To whom it may concern:     Narcisa Quiles is under my care for   1. Chronic pain of right knee        Date of injury: chronic      Date of surgery: TBD    Return to work date: 6/19/17   ** WITH RESTRICTIONS? Yes, with work restrictions: * Other: Sedentary duties.  DURATION OF LIMITATIONS: Until surgery.       Next appointment: 2 weeks post operative         Electronically Signed (as below)  Dr. Dale Sanchez M.D.

## 2017-06-19 NOTE — NURSING NOTE
"Chief Complaint   Patient presents with     RECHECK     Right knee pain last injection 5/12/17 with only a few days of relief. Patient states she had 6 weeks of PT 2 months ago with no results. Pain level 10/10, the pain radiates down the side of her calf. Patient states the knee clicks and gives out on her. Patient had the left knee replaced in 2012 by a Dr. Stephens through [facility].        Initial Temp 98.6  F (37  C)  Resp 18  Ht 1.664 m (5' 5.5\")  Wt 111.6 kg (246 lb)  BMI 40.31 kg/m2 Estimated body mass index is 40.31 kg/(m^2) as calculated from the following:    Height as of this encounter: 1.664 m (5' 5.5\").    Weight as of this encounter: 111.6 kg (246 lb).  Medication Reconciliation: complete   Theresa Neely MA      "

## 2017-06-19 NOTE — TELEPHONE ENCOUNTER
Called and spoke to patient letting her know that she would need to schedule an appointment with Dr. Sanchez. I transferred her to the scheduling line.   Lorena Ely Certified Medical Assistant

## 2017-06-19 NOTE — PROGRESS NOTES
CHIEF COMPLAINT:   Chief Complaint   Patient presents with     RECHECK     Right knee pain last injection 5/12/17 with only a few days of relief. Patient states she had 6 weeks of PT 2 months ago with no results. Pain level 10/10, the pain radiates down the side of her calf. Patient states the knee clicks and gives out on her. Patient had the left knee replaced in 2012 by a Dr. Stephens through [facility].        HISTORY OF PRESENT ILLNESS    Narcisa Quiles is a 52 year old female seen for followup evaluation of ongoing right knee pain, with no known injury. Pain has been present for over a year in the right knee. She returns today with continuing symptoms. Pain is rated a 10/10, worst pain. Her pain radiates down into her leg. She notes that pain keeps her up at night. Her pain is lateral and anterior and radiates down the lateral leg, sometimes even into the heel. This past weekend, she iced it, rested, elevated it, with only minimal relief. She notes the right knee clicks and sometimes gives out on her. Her last injection was on 5/12/2017, providing her only a few days of relief. Also has done 6 weeks of physical therapy about  Months ago with no relief. Today she would like to discuss her options.    She has a history of left TKA in 2011 with [facility] with Dr. Burton.     Present symptoms: moderate-severe  pain, mild swelling.  Pain severity: 10/10 right knee.  Frequency of symptoms: frequently  Exacerbating Factors: walking  Relieving Factors: rest  Night Pain: No  Pain while at rest: No   Numbness or tingling: No   Patient has tried:     NSAIDS: Yes      Physical Therapy: Yes      Activity modification: Yes      Bracing: No      Injections: Yes, cortisone without relief.     Ice: No      Assistive device:  No    Orthopaedic PMH: left TKA in 2011 with [facility] with Dr. Burton.    Other PMH:   Patient Active Problem List    Diagnosis Date Noted     History of total left knee replacement 09/14/2016     Priority:  Medium     Major depressive disorder, recurrent episode, moderate (H) 05/13/2016     Priority: Medium     Other iron deficiency anemia 05/13/2016     Priority: Medium     Hyperlipidemia with target LDL less than 130 06/11/2014     Priority: Medium     Major depression 06/11/2014     Priority: Medium       Surgical Hx:  has a past surgical history that includes Hysterectomy vaginal, bilateral salpingo-oopherectomy, combined. left total knee arthroplasty 2011 with [facility].    Medications:   Current Outpatient Prescriptions:      buPROPion (WELLBUTRIN SR) 100 MG 12 hr tablet, Take 1 tablet (100 mg) by mouth 2 times daily, Disp: 180 tablet, Rfl: 1     estradiol (ESTRACE) 0.5 MG tablet, Take 1 tablet (0.5 mg) by mouth daily, Disp: 90 tablet, Rfl: 3     FLUoxetine (PROZAC) 20 MG capsule, Take 1 capsule (20 mg) by mouth daily, Disp: 90 capsule, Rfl: 1    Allergies: No Known Allergies    Social Hx: works as a dental assistant.   reports that she has quit smoking. Her smoking use included Cigarettes. She started smoking about 8 years ago. She has never used smokeless tobacco. She reports that she drinks alcohol. She reports that she does not use illicit drugs.    Family Hx: family history includes Alzheimer Disease in her maternal grandmother and paternal grandmother; CANCER in her maternal grandfather and mother; Depression in her daughter; Eye Surgery in her father; HEART DISEASE in her father and maternal grandfather; Hypertension in her father, maternal grandmother, and sister; Prostate Cancer in her maternal grandfather; Thyroid Disease in her mother. There is no history of Macular Degeneration or Retinal detachment.     This document serves as a record of the services and decisions personally performed and made by Dale Sanchez MD. It was created on his behalf by Yumiko Ceballos, a trained medical scribe. The creation of this document is based the provider's statements to the medical scribe.    Nirali Calderon  "Ceballos 2:29 PM 6/19/2017     REVIEW OF SYSTEMS:   CONSTITUTIONAL:NEGATIVE for fever, chills, change in weight  INTEGUMENTARY/SKIN: NEGATIVE for worrisome rashes, moles or lesions  MUSCULOSKELETAL:See HPI above  NEURO: NEGATIVE for weakness, dizziness or paresthesias    PHYSICAL EXAM:  Temp 98.6  F (37  C)  Resp 18  Ht 1.664 m (5' 5.5\")  Wt 111.6 kg (246 lb)  BMI 40.31 kg/m2   GENERAL APPEARANCE: healthy, alert, no distress  SKIN: no suspicious lesions or rashes  NEURO: Normal strength and tone, mentation intact and speech normal  PSYCH:  mentation appears normal and affect normal, not anxious  RESPIRATORY: No increased work of breathing.    BILATERAL LOWER EXTREMITIES:  Gait: normal  Alignment: neutral on left, varus on right  No gross deformities or masses.  No Quad atrophy, strength normal.  Intact sensation deep peroneal nerve, superficial peroneal nerve, med/lat tibial nerve, sural nerve, saphenous nerve  Intact EHL, EDL, TA, FHL, GS, quadriceps hamstrings and hip flexors  Toes warm and well perfused, brisk capillary refill. Palpable 2+ dp pulses.  Bilateral calf soft and nttp or squeeze.  Edema: trace    RIGHT KNEE EXAM:    Skin: intact, no ecchymosis or erythema  Squat: 40 %, limited by anterior pain.  ROM: 2 extension to 120 flexion  Tight hamstrings on straight leg raise.  Effusion: trace  Tender: medial joint line, pes bursa, lateral joint line, anterior knee  NTTP posterior knee    MCL: stable, and non-painful at both 0 and 30 degrees knee flexion  Varus stress: stable, and non-painful at both 0 and 30 degrees knee flexion  Lachmans: neg, firm endpoint  Posterior Drawer stable  Patellofemoral joint:                Apprehension: negative              Crepitations: positive   Grind: positive      X-RAY: 3 views right knee 6/19/2017 were reviewed in clinic today. On my review, no obvious fractures or dislocations. There is severe narrowing of the medial compartment to near bone on bone, marginal " "osteophytes There is bone on bone arthritis of the patellofemoral compartment, notably lateral, with subchondral cysts and sclerosis, large marginal osteophytes.     Impression:   52 year old female with right knee pain, advanced primary osteoarthritis.      Plan:   * reviewed imaging studies with patient, showing arthritis. Arthritis is wearing of the cartilage due to longstanding \"wear and tear\" or can follow an injury to the joint.    Discussed typical symptoms of arthritic pain is pain aggravated with weight bearing activities, stiffness, relieved by sitting or rest. Swelling may be associated. It is common to have some grinding and popping in the knee with arthritis.    Discussed various treatments for degenerative arthrosis of the knee, including nonoperative and operative approaches. Nonoperative approaches, which are exhausted prior to operative treatment, include doing nothing and living with the pain as patient has been doing, activity modification (avoid aggravating activities), physical therapy and strengthening exercises, weight loss, anti-inflammatory medications, bracing, ambulatory aids (cane, walker) and injections. Once these have been tried and are deemed unsuccessful with a good effort, and the patient is an appropriate candidate, the next treatment would be knee arthroplasty or replacement. Depending on the location of the arthritis, knee replacement can be partial (one side of the knee affected by arthritis) or a total knee arthroplasty (all 3 compartments). The risks, perceived benefits and perioperative rehabilitation expectations of knee arthroplasty were discussed in detail. Also discussed that approximately 10% of patients that undergo knee arthroplasty are not happy following surgery and may have worse pain or no improvement in pain, contrary to their preoperative expectations.    At this time, patient would like to proceed with surgery    ** Risks of surgery include, but not limited to: " "bleeding (possibly requiring transfusion), infection, pain, scar, damage to adjacent structures (e.g. Nerves, blood vessels, bone, cartilage), temporary or permanent nerve damage, recurrence of symptoms, implant dislocation, instability, implant failure, implant infection, unequal limb lengths, stiffness, need for further surgery, blood clots, pulmonary embolism, risks of anesthesia, and death.  * the knee will not feel \"normal\" as it won't be \"normal\" after knee replacement. It may feel \"clunky\" due to the nature of the hard metal and plastic implant.  * the expectation is for improved pain, not necessarily complete pain relief.    ** understanding these risks, the patient would like to proceed with surgery: right total knee arthroplasty.    * will arrange right total knee arthroplasty at a mutual convenience in the near future  * discussed will plan hospitalization anywhere from 1-3 days (average 2 days), with twice daily Physical Therapy starting either the day of or day after surgery, with discharge to home or short term rehabilitation facility, depending on postoperative recovery and progress during hospitalization.  * will plan postoperative deep vein thrombosis prophylaxis x4 weeks. Additionally, graduated compression stockings x4 weeks, and SCDs while in the hospital.  * postoperative pain control will be oral medications upon discharge from hospital  * postoperative Physical Therapy to start upon discharge from the hospital.  * patient will need preoperative H+P prior to surgery from PCP.  * will see patient 2 weeks postoperative, sooner as needed, for wound check, suture removal, and xrays. Will obtain AP, lateral and sunrise views of the knee at that time.    * patient encouraged to call in interim if any new questions or concerns arise.    * recommend no elective dental procedures for 4-6 months after surgery. Any emergency dental procedures, mouth infections, abscesses, etc must be taken care of " immediately with preventive antibiotics.   * Patient will need longterm prophylactic antibiotics use with dental procedures or other invasive procedures (2 g amoxicilin or 450mg ([id number]) clindamycin) 1 hour prior to dental procedures for a minimum of 2 years postoperative.      The information in this document, created by a scribe for me, accurately reflects the services I personally performed and the decisions made by me. I have reviewed and approved this document for accuracy.      Dale Sanchez M.D., M.S.  Dept. of Orthopaedic Surgery  [facility]

## 2017-06-20 ENCOUNTER — TELEPHONE (OUTPATIENT)
Dept: FAMILY MEDICINE | Facility: CLINIC | Age: 52
End: 2017-06-20

## 2017-06-20 NOTE — TELEPHONE ENCOUNTER
Reason for call:  Question  Patient called regarding (reason for call): She would like to know if she can do her surgery in July instead of August which would be better for her job. She don't think she will be able to walk  Additional comments: Please call patient to discuss further    Phone number to reach patient:  Home number on file [phone] (home)    Best Time:  anytime    Can we leave a detailed message on this number?  YES

## 2017-06-20 NOTE — TELEPHONE ENCOUNTER
Called and talked to patient. She said she called and they don't have any availability. I apologized and told her there was nothing I could do that if they don't have anything available she would have to wait. She thanked me for calling.  Lorena Ely Certified Medical Assistant

## 2017-07-25 ENCOUNTER — TELEPHONE (OUTPATIENT)
Dept: ORTHOPEDICS | Facility: CLINIC | Age: 52
End: 2017-07-25

## 2017-07-25 NOTE — TELEPHONE ENCOUNTER
Reason for Call:  Other call back    Detailed comments:  Patient calling. She wants to know if she can go to a rehab after surgery. Where ? And if covered by insurance. Please call her back.    Phone Number Patient can be reached at: Home number on file [phone] (home)    Best Time:  Any     Can we leave a detailed message on this number? YES    Call taken on 7/25/2017 at 11:58 AM by Carin Shi

## 2017-07-26 NOTE — TELEPHONE ENCOUNTER
Called and talked to patient letting her know that rehab is set up through the hospital and that she would have to contact her insurance company to see if it was covered. She thanked me for calling.  Lorena Ely Certified Medical Assistant

## 2017-07-31 NOTE — PROGRESS NOTES
[facility]  [address] [facility] [address] [address]  [phone]  Dept: [phone]    PRE-OP EVALUATION:  Today's date: 2017    Narcisa Quiles (: 1965) presents for pre-operative evaluation assessment as requested by Dr. Sanchez.  She requires evaluation and anesthesia risk assessment prior to undergoing surgery/procedure for treatment of right .  Proposed procedure: knee    Date of Surgery/ Procedure: 2017  Time of Surgery/ Procedure: 7:00am  Hospital/Surgical Facility: [facility]  right TKR. Had left TKR  - went ok.     Primary Physician: [facility]  Type of Anesthesia Anticipated: to be determined    Patient has a Health Care Directive or Living Will:  NO    1. NO - Do you have a history of heart attack, stroke, stent, bypass or surgery on an artery in the head, neck, heart or legs?  2. NO - Do you ever have any pain or discomfort in your chest?  3. NO - Do you have a history of  Heart Failure?  4. NO - Are you troubled by shortness of breath when: walking on the level, up a slight hill or at night?  5. NO - Do you currently have a cold, bronchitis or other respiratory infection?  6. NO - Do you have a cough, shortness of breath or wheezing?  7. NO - Do you sometimes get pains in the calves of your legs when you walk?  8. NO - Do you or anyone in your family have previous history of blood clots?  9. NO - Do you or does anyone in your family have a serious bleeding problem such as prolonged bleeding following surgeries or cuts?  10. NO - Have you ever had problems with anemia or been told to take iron pills?  11. yes - Have you had any abnormal blood loss such as black, tarry or bloody stools, or abnormal vaginal bleeding? Ulcers  stomach - from alleve.   12. NO - Have you ever had a blood transfusion?  13. NO - Have you or any of your relatives ever had problems with anesthesia?  14. NO - Do you have sleep apnea, excessive snoring or daytime  drowsiness?  15. NO - Do you have any prosthetic heart valves?  16. yes- Do you have prosthetic joints? Left knee   17. NO - Is there any chance that you may be pregnant?        HPI:                                                      Brief HPI related to upcoming procedure: chronic knee pain.     DEPRESSION - Patient has a long history of Depression of moderate severity requiring medication for control. Stable.                                                                                                                                                                                     .    MEDICAL HISTORY:                                                    Patient Active Problem List    Diagnosis Date Noted     History of total left knee replacement 09/14/2016     Priority: Medium     Major depressive disorder, recurrent episode, moderate (H) 05/13/2016     Priority: Medium     Other iron deficiency anemia 05/13/2016     Priority: Medium     Hyperlipidemia with target LDL less than 130 06/11/2014     Priority: Medium     Major depression 06/11/2014     Priority: Medium      No past medical history on file.  Past Surgical History:   Procedure Laterality Date     HYSTERECTOMY VAGINAL, BILATERAL SALPINGO-OOPHERECTOMY, COMBINED       Current Outpatient Prescriptions   Medication Sig Dispense Refill     buPROPion (WELLBUTRIN SR) 100 MG 12 hr tablet Take 1 tablet (100 mg) by mouth 2 times daily 180 tablet 1     estradiol (ESTRACE) 0.5 MG tablet Take 1 tablet (0.5 mg) by mouth daily 90 tablet 3     FLUoxetine (PROZAC) 20 MG capsule Take 1 capsule (20 mg) by mouth daily 90 capsule 1     OTC products: None, except as noted above    No Known Allergies   Latex Allergy: NO    Social History   Substance Use Topics     Smoking status: Former Smoker     Types: Cigarettes     Start date: 6/6/2009     Smokeless tobacco: Never Used     Alcohol use Yes      Comment: Occasional     History   Drug Use No       REVIEW OF SYSTEMS:     "                                                C: NEGATIVE for fever, chills, change in weight  INTEGUMENTARY/SKIN: NEGATIVE for worrisome rashes  E/M: NEGATIVE for ear, mouth and throat problems  R: NEGATIVE for significant cough or SOB  CV: NEGATIVE for chest pain, palpitations or peripheral edema. Good exercise tolerance  GI: NEGATIVE for nausea, abdominal pain, heartburn, or change in bowel habits. No black or bloody stools.   : no urine changes. S/p hysterectomy - endometrosis - non-cancers  MUSCULOSKELETAL: NEGATIVE for significant arthralgias or myalgia  NEURO: NEGATIVE for weakness, dizziness or paresthesias  ENDOCRINE: NEGATIVE for temperature intolerance, skin/hair changes  HEME/ALLERGY/IMMUNE: NEGATIVE for bleeding problems  PSYCHIATRIC: NEGATIVE for changes in mood or affect    EXAM:                                                    There were no vitals taken for this visit.   /86  Pulse 87  Temp 98.4  F (36.9  C) (Oral)  Ht 5' 5.5\" (1.664 m)  Wt 245 lb (111.1 kg)  SpO2 99%  BMI 40.15 kg/m2    GENERAL APPEARANCE: healthy, alert and no distress  EYES: Eyes grossly normal to inspection, PERRL and conjunctivae and sclerae normal  HENT: ear canals and TM's normal and nose and mouth without ulcers or lesions  NECK: no adenopathy, no asymmetry, masses, or scars and thyroid normal to palpation  RESP: lungs clear to auscultation - no rales, rhonchi or wheezes  CV: regular rate and rhythm, normal S1 S2, no S3 or S4 and no murmur, click or rub   ABDOMEN: soft, nontender, no HSM or masses and bowel sounds normal  MS: extremities normal- no gross deformities noted  NEURO: Normal strength and tone, sensory exam grossly normal, mentation intact and speech normal  PSYCH: mentation appears normal and affect normal/bright    DIAGNOSTICS:                                                      EKG: appears normal, NSR, normal axis, normal intervals, no acute ST/T changes c/w ischemia, no LVH by voltage " criteria, unchanged from previous tracings  Labs Resulted Today:   Results for orders placed or performed in visit on 08/08/17   CBC with platelets   Result Value Ref Range    WBC 7.9 4.0 - 11.0 10e9/L    RBC Count 4.40 3.8 - 5.2 10e12/L    Hemoglobin 13.6 11.7 - 15.7 g/dL    Hematocrit 41.3 35.0 - 47.0 %    MCV 94 78 - 100 fl    MCH 30.9 26.5 - 33.0 pg    MCHC 32.9 31.5 - 36.5 g/dL    RDW 12.5 10.0 - 15.0 %    Platelet Count 299 150 - 450 10e9/L   UA reflex to Microscopic and Culture   Result Value Ref Range    Color Urine Yellow     Appearance Urine Clear     Glucose Urine Negative NEG mg/dL    Bilirubin Urine Negative NEG    Ketones Urine Negative NEG mg/dL    Specific Gravity Urine <=1.005 1.003 - 1.035    Blood Urine Negative NEG    pH Urine 6.0 5.0 - 7.0 pH    Protein Albumin Urine Negative NEG mg/dL    Urobilinogen Urine 0.2 0.2 - 1.0 EU/dL    Nitrite Urine Negative NEG    Leukocyte Esterase Urine Negative NEG    Source Midstream Urine      Labs Drawn and in Process:   Unresulted Labs Ordered in the Past 30 Days of this Admission     No orders found from 6/9/2017 to 8/9/2017.          Recent Labs   Lab Test  08/12/16   0842  05/10/16   0742  05/09/16   1157   06/16/15   1725   HGB  13.3  9.4*  9.6*   < >   --    PLT  247   --   271   --    --    NA   --    --    --    --   141   POTASSIUM   --    --    --    --   4.3   CR   --    --    --    --   0.56    < > = values in this interval not displayed.        IMPRESSION:                                                    Reason for surgery/procedure: chronic right knee pain//right TKR  Diagnosis/reason for consult: depression/HRT// fitness for surgery  Ok for surgery. Did well with left knee. Denies chest pain or shortness of breath. Ekg/labs and blood pressure ok. Non-smoker and lipids ok in past.    ASSESSMENT / PLAN:  (F33.1) Major depressive disorder, recurrent episode, moderate (H)  Comment: stable  Plan: buPROPion (WELLBUTRIN SR) 100 MG 12 hr tablet,          FLUoxetine (PROZAC) 20 MG capsule, DEPRESSION         ACTION PLAN (DAP)        Recheck in 6 months  Sooner if worse. If SUICIAL IDEATION OR HOMOCIDAL IDEATION OR ROMAN TO ER. Exercise. Call/email with questions/concerns   (Z90.827) Acquired absence of both cervix and uterus  Comment: stable  Plan: estradiol (ESTRACE) 0.5 MG tablet        Briefly discuss being on estrogren long term. Placed on by ob/gyn. Reveiwed risks and side effects of medication  Needs yearly mammogram and should wean off in future - maybe 54yo. Patient NOT interested in stopping at this point. Call/email with questions/concerns. Recheck in 6 months  Consider back to ob/gyn for second opinion    The proposed surgical procedure is considered INTERMEDIATE risk.    REVISED CARDIAC RISK INDEX  The patient has the following serious cardiovascular risks for perioperative complications such as (MI, PE, VFib and 3  AV Block):  No serious cardiac risks  INTERPRETATION: 1 risks: Class II (low risk - 0.9% complication rate)    The patient has the following additional risks for perioperative complications:  No identified additional risks      RECOMMENDATIONS:                                                      --Consult hospital rounder / IM to assist post-op medical management    --Patient is to take all scheduled medications on the day of surgery EXCEPT for modifications listed below.    APPROVAL GIVEN to proceed with proposed procedure, without further diagnostic evaluation       Signed Electronically by: Shankar Parada MD    Copy of this evaluation report is provided to requesting physician.    [facility] Preop Guidelines

## 2017-08-08 ENCOUNTER — OFFICE VISIT (OUTPATIENT)
Dept: FAMILY MEDICINE | Facility: CLINIC | Age: 52
End: 2017-08-08
Payer: COMMERCIAL

## 2017-08-08 VITALS
OXYGEN SATURATION: 99 % | SYSTOLIC BLOOD PRESSURE: 130 MMHG | HEART RATE: 87 BPM | BODY MASS INDEX: 39.37 KG/M2 | WEIGHT: 245 LBS | HEIGHT: 66 IN | DIASTOLIC BLOOD PRESSURE: 86 MMHG | TEMPERATURE: 98.4 F

## 2017-08-08 DIAGNOSIS — M25.561 RIGHT KNEE PAIN, UNSPECIFIED CHRONICITY: ICD-10-CM

## 2017-08-08 DIAGNOSIS — Z90.710 ACQUIRED ABSENCE OF BOTH CERVIX AND UTERUS: ICD-10-CM

## 2017-08-08 DIAGNOSIS — Z79.890 SURGICAL MENOPAUSE ON HORMONE REPLACEMENT THERAPY: ICD-10-CM

## 2017-08-08 DIAGNOSIS — Z01.818 PREOP GENERAL PHYSICAL EXAM: Primary | ICD-10-CM

## 2017-08-08 DIAGNOSIS — E89.40 SURGICAL MENOPAUSE ON HORMONE REPLACEMENT THERAPY: ICD-10-CM

## 2017-08-08 DIAGNOSIS — F33.1 MAJOR DEPRESSIVE DISORDER, RECURRENT EPISODE, MODERATE (H): ICD-10-CM

## 2017-08-08 LAB
ALBUMIN UR-MCNC: NEGATIVE MG/DL
APPEARANCE UR: CLEAR
BILIRUB UR QL STRIP: NEGATIVE
COLOR UR AUTO: YELLOW
ERYTHROCYTE [DISTWIDTH] IN BLOOD BY AUTOMATED COUNT: 12.5 % (ref 10–15)
GLUCOSE UR STRIP-MCNC: NEGATIVE MG/DL
HCT VFR BLD AUTO: 41.3 % (ref 35–47)
HGB BLD-MCNC: 13.6 G/DL (ref 11.7–15.7)
HGB UR QL STRIP: NEGATIVE
KETONES UR STRIP-MCNC: NEGATIVE MG/DL
LEUKOCYTE ESTERASE UR QL STRIP: NEGATIVE
MCH RBC QN AUTO: 30.9 PG (ref 26.5–33)
MCHC RBC AUTO-ENTMCNC: 32.9 G/DL (ref 31.5–36.5)
MCV RBC AUTO: 94 FL (ref 78–100)
NITRATE UR QL: NEGATIVE
PH UR STRIP: 6 PH (ref 5–7)
PLATELET # BLD AUTO: 299 10E9/L (ref 150–450)
RBC # BLD AUTO: 4.4 10E12/L (ref 3.8–5.2)
SP GR UR STRIP: <=1.005 (ref 1–1.03)
URN SPEC COLLECT METH UR: NORMAL
UROBILINOGEN UR STRIP-ACNC: 0.2 EU/DL (ref 0.2–1)
WBC # BLD AUTO: 7.9 10E9/L (ref 4–11)

## 2017-08-08 PROCEDURE — 93000 ELECTROCARDIOGRAM COMPLETE: CPT | Performed by: FAMILY MEDICINE

## 2017-08-08 PROCEDURE — 81003 URINALYSIS AUTO W/O SCOPE: CPT | Performed by: FAMILY MEDICINE

## 2017-08-08 PROCEDURE — 85027 COMPLETE CBC AUTOMATED: CPT | Performed by: FAMILY MEDICINE

## 2017-08-08 PROCEDURE — 36415 COLL VENOUS BLD VENIPUNCTURE: CPT | Performed by: FAMILY MEDICINE

## 2017-08-08 PROCEDURE — 99214 OFFICE O/P EST MOD 30 MIN: CPT | Mod: 25 | Performed by: FAMILY MEDICINE

## 2017-08-08 RX ORDER — BUPROPION HYDROCHLORIDE 100 MG/1
100 TABLET, EXTENDED RELEASE ORAL 2 TIMES DAILY
Qty: 180 TABLET | Refills: 1 | Status: SHIPPED | OUTPATIENT
Start: 2017-08-08 | End: 2018-06-08

## 2017-08-08 RX ORDER — ESTRADIOL 0.5 MG/1
0.5 TABLET ORAL DAILY
Qty: 90 TABLET | Refills: 1 | Status: SHIPPED | OUTPATIENT
Start: 2017-08-08 | End: 2018-06-08

## 2017-08-08 ASSESSMENT — ANXIETY QUESTIONNAIRES
1. FEELING NERVOUS, ANXIOUS, OR ON EDGE: NOT AT ALL
7. FEELING AFRAID AS IF SOMETHING AWFUL MIGHT HAPPEN: NOT AT ALL
3. WORRYING TOO MUCH ABOUT DIFFERENT THINGS: NOT AT ALL
GAD7 TOTAL SCORE: 0
5. BEING SO RESTLESS THAT IT IS HARD TO SIT STILL: NOT AT ALL
2. NOT BEING ABLE TO STOP OR CONTROL WORRYING: NOT AT ALL
6. BECOMING EASILY ANNOYED OR IRRITABLE: NOT AT ALL

## 2017-08-08 ASSESSMENT — PATIENT HEALTH QUESTIONNAIRE - PHQ9
SUM OF ALL RESPONSES TO PHQ QUESTIONS 1-9: 0
5. POOR APPETITE OR OVEREATING: NOT AT ALL

## 2017-08-08 NOTE — NURSING NOTE
"Chief Complaint   Patient presents with     Pre-Op Exam       Initial /86  Pulse 87  Temp 98.4  F (36.9  C) (Oral)  Ht 5' 5.5\" (1.664 m)  Wt 245 lb (111.1 kg)  SpO2 99%  BMI 40.15 kg/m2 Estimated body mass index is 40.15 kg/(m^2) as calculated from the following:    Height as of this encounter: 5' 5.5\" (1.664 m).    Weight as of this encounter: 245 lb (111.1 kg).  Medication Reconciliation: complete   Ankita Pressley CMA    "

## 2017-08-08 NOTE — MR AVS SNAPSHOT
After Visit Summary   8/8/2017    Narcisa Quiles    MRN: [id number]           Patient Information     Date Of Birth          1965        Visit Information        Provider Department      8/8/2017 6:10 PM Shankar Parada MD [facility]        Today's Diagnoses     Preop general physical exam    -  1    Right knee pain, unspecified chronicity        Major depressive disorder, recurrent episode, moderate (H)        Acquired absence of both cervix and uterus        Surgical menopause on hormone replacement therapy          Care Instructions      Before Your Surgery      Call your surgeon if there is any change in your health. This includes signs of a cold or flu (such as a sore throat, runny nose, cough, rash or fever).    Do not smoke, drink alcohol or take over the counter medicine (unless your surgeon or primary care doctor tells you to) for the 24 hours before and after surgery.    If you take prescribed drugs: Follow your doctor s orders about which medicines to take and which to stop until after surgery.    Eating and drinking prior to surgery: follow the instructions from your surgeon    Take a shower or bath the night before surgery. Use the soap your surgeon gave you to gently clean your skin. If you do not have soap from your surgeon, use your regular soap. Do not shave or scrub the surgery site.  Wear clean pajamas and have clean sheets on your bed.           Follow-ups after your visit        Your next 10 appointments already scheduled     Sep 06, 2017 11:00 AM CDT   Return Visit with Dale Sanchez MD   [facility] [place] ([facility])    [address] [facility] [id number] [phone]              Who to contact     If you have questions or need follow up information about today's clinic visit or your schedule please contact [facility] directly at [phone].  Normal or non-critical lab and imaging results will be  "communicated to you by [employer], letter or phone within 4 business days after the clinic has received the results. If you do not hear from us within 7 days, please contact the clinic through [employer] or phone. If you have a critical or abnormal lab result, we will notify you by phone as soon as possible.  Submit refill requests through [employer] or call your pharmacy and they will forward the refill request to us. Please allow 3 business days for your refill to be completed.          Additional Information About Your Visit        [employer] Information     [employer] gives you secure access to your electronic health record. If you see a primary care provider, you can also send messages to your care team and make appointments. If you have questions, please call your primary care clinic.  If you do not have a primary care provider, please call [phone] and they will assist you.        Care EveryWhere ID     This is your Care EveryWhere ID. This could be used by other organizations to access your [place] medical records  [id number]        Your Vitals Were     Pulse Temperature Height Pulse Oximetry BMI (Body Mass Index)       87 98.4  F (36.9  C) (Oral) 5' 5.5\" (1.664 m) 99% 40.15 kg/m2        Blood Pressure from Last 3 Encounters:   08/08/17 130/86   05/12/17 (!) 143/96   03/03/17 124/82    Weight from Last 3 Encounters:   08/08/17 245 lb (111.1 kg)   06/19/17 246 lb (111.6 kg)   05/12/17 241 lb (109.3 kg)              We Performed the Following     CBC with platelets     DEPRESSION ACTION PLAN (DAP)     EKG 12-lead complete w/read - Clinics     UA reflex to Microscopic and Culture          Where to get your medicines      These medications were sent to [facility] [address], [address]  [address] [address], [facility] [[email or web address] [address], [facility] [address]     Phone:  [phone]     buPROPion 100 MG 12 hr tablet    estradiol 0.5 MG tablet    FLUoxetine 20 MG capsule          Primary Care Provider Office " Phone # Fax #    [facility] [phone] [address] [facility] [address] [facility]. [facility] [address]        Equal Access to Services     JARED TOVAR : Hadii aad ku hadmarva Sokasandra, wafernandada luqadaha, qatiffanieta kaalmada jacqui, sarika reardondanny snydertennille cloud ed rivas. So [facility] [phone].    ATENCIÓN: Si habla español, tiene a owusu disposición servicios gratuitos de asistencia lingüística. Llame al [phone].    We comply with applicable federal civil rights laws and [place] laws. We do not discriminate on the basis of race, color, national origin, age, disability sex, sexual orientation or gender identity.            Thank you!     Thank you for choosing [facility]  for your care. Our goal is always to provide you with excellent care. Hearing back from our patients is one way we can continue to improve our services. Please take a few minutes to complete the written survey that you may receive in the mail after your visit with us. Thank you!             Your Updated Medication List - Protect others around you: Learn how to safely use, store and throw away your medicines at www.disposemymeds.org.          This list is accurate as of: 8/8/17  7:30 PM.  Always use your most recent med list.                   Brand Name Dispense Instructions for use Diagnosis    buPROPion 100 MG 12 hr tablet    WELLBUTRIN SR    180 tablet    Take 1 tablet (100 mg) by mouth 2 times daily    Major depressive disorder, recurrent episode, moderate (H)       estradiol 0.5 MG tablet    ESTRACE    90 tablet    Take 1 tablet (0.5 mg) by mouth daily    Acquired absence of both cervix and uterus, Surgical menopause on hormone replacement therapy       FLUoxetine 20 MG capsule    PROzac    90 capsule    Take 1 capsule (20 mg) by mouth daily    Major depressive disorder, recurrent episode, moderate (H)

## 2017-08-09 ASSESSMENT — ANXIETY QUESTIONNAIRES: GAD7 TOTAL SCORE: 0

## 2017-08-22 ENCOUNTER — TRANSFERRED RECORDS (OUTPATIENT)
Dept: HEALTH INFORMATION MANAGEMENT | Facility: CLINIC | Age: 52
End: 2017-08-22

## 2017-08-28 ENCOUNTER — TELEPHONE (OUTPATIENT)
Dept: ORTHOPEDICS | Facility: CLINIC | Age: 52
End: 2017-08-28

## 2017-08-28 NOTE — TELEPHONE ENCOUNTER
Reason for call:  Other   Patient called regarding (reason for call): call back  Additional comments: Patient had surgery on right leg last Tuesday and now has a big bruise on the back of her leg. Requesting a call back to discuss if that is normal.       Phone number to reach patient:  Home number on file [phone] (home)    Best Time:  anytime    Can we leave a detailed message on this number?  YES

## 2017-08-28 NOTE — TELEPHONE ENCOUNTER
I spoke to Narcisa and let her know that the bruising is totally normal, and based on what I saw in the hospital I anticipated that some bruising behind the knee would be expected. She appreciated the call back.    Pedro Pablo PA-C, [facility] (Ortho)  Supervising Physician: Dale Sanchez M.D., M.S.  Dept. of Orthopaedic Surgery  [facility]

## 2017-08-29 ENCOUNTER — THERAPY VISIT (OUTPATIENT)
Dept: PHYSICAL THERAPY | Facility: CLINIC | Age: 52
End: 2017-08-29
Payer: COMMERCIAL

## 2017-08-29 DIAGNOSIS — Z47.1 AFTERCARE FOLLOWING RIGHT KNEE JOINT REPLACEMENT SURGERY: Primary | ICD-10-CM

## 2017-08-29 DIAGNOSIS — Z96.651 AFTERCARE FOLLOWING RIGHT KNEE JOINT REPLACEMENT SURGERY: Primary | ICD-10-CM

## 2017-08-29 PROCEDURE — 97110 THERAPEUTIC EXERCISES: CPT | Mod: GP | Performed by: PHYSICAL THERAPIST

## 2017-08-29 PROCEDURE — 97161 PT EVAL LOW COMPLEX 20 MIN: CPT | Mod: GP | Performed by: PHYSICAL THERAPIST

## 2017-08-29 ASSESSMENT — ACTIVITIES OF DAILY LIVING (ADL)
HOW_WOULD_YOU_RATE_THE_CURRENT_FUNCTION_OF_YOUR_KNEE_DURING_YOUR_USUAL_DAILY_ACTIVITIES_ON_A_SCALE_FROM_0_TO_100_WITH_100_BEING_YOUR_LEVEL_OF_KNEE_FUNCTION_PRIOR_TO_YOUR_INJURY_AND_0_BEING_THE_INABILITY_TO_PERFORM_ANY_OF_YOUR_USUAL_DAILY_ACTIVITIES?: 25
KNEE_ACTIVITY_OF_DAILY_LIVING_SCORE: 2.86
AS_A_RESULT_OF_YOUR_KNEE_INJURY,_HOW_WOULD_YOU_RATE_YOUR_CURRENT_LEVEL_OF_DAILY_ACTIVITY?: SEVERELY ABNORMAL
KNEEL ON THE FRONT OF YOUR KNEE: I AM UNABLE TO DO THE ACTIVITY
WALK: I AM UNABLE TO DO THE ACTIVITY
SQUAT: I AM UNABLE TO DO THE ACTIVITY
GO UP STAIRS: I AM UNABLE TO DO THE ACTIVITY
WEAKNESS: THE SYMPTOM PREVENTS ME FROM ALL DAILY ACTIVITIES
SWELLING: THE SYMPTOM PREVENTS ME FROM ALL DAILY ACTIVITIES
STAND: I AM UNABLE TO DO THE ACTIVITY
SIT WITH YOUR KNEE BENT: ACTIVITY IS VERY DIFFICULT
PAIN: THE SYMPTOM PREVENTS ME FROM ALL DAILY ACTIVITIES
GIVING WAY, BUCKLING OR SHIFTING OF KNEE: THE SYMPTOM PREVENTS ME FROM ALL DAILY ACTIVITIES
STIFFNESS: THE SYMPTOM PREVENTS ME FROM ALL DAILY ACTIVITIES
HOW_WOULD_YOU_RATE_THE_OVERALL_FUNCTION_OF_YOUR_KNEE_DURING_YOUR_USUAL_DAILY_ACTIVITIES?: SEVERELY ABNORMAL
RAW_SCORE: 2
GO DOWN STAIRS: I AM UNABLE TO DO THE ACTIVITY
RISE FROM A CHAIR: ACTIVITY IS VERY DIFFICULT
LIMPING: THE SYMPTOM PREVENTS ME FROM ALL DAILY ACTIVITIES
KNEE_ACTIVITY_OF_DAILY_LIVING_SUM: 2

## 2017-08-29 NOTE — LETTER
[facility]  [address]  [place] [address]  [phone]    2017    Re: Narcisa Quiles   :   1965  MRN:  [id number]   REFERRING PHYSICIAN:   [facility]    Date of Initial Evaluation:  17  Visits:  Rxs Used: 1  Reason for Referral:  Aftercare following right knee joint replacement surgery    EVALUATION SUMMARY    [facility] [facility] Initial Evaluation      Subjective:    Patient is a 52 year old female presenting with rehab right knee hpi.   Narcisa Quiles is a 52 year old female with a right knee condition.  Condition occurred with:  Degenerative joint disease.  Condition occurred: in the community.  This is a new condition  S/p R TKA on 17.    Patient reports pain:  Anterior.  Radiates to: none.  Pain is described as aching and is constant and reported as 6/10.  Associated symptoms:  Edema, loss of motion/stiffness and loss of strength. Pain is the same all the time.  Symptoms are exacerbated by activity, weight bearing, certain positions and walking and relieved by rest, ice and NSAID's.  Since onset symptoms are gradually improving.        General health as reported by patient is fair.  Pertinent medical history includes:  Overweight, osteoarthritis, depression and menopausal.  Medical allergies: no.  Other surgeries include:  Orthopedic surgery (L TKA ).  Current medications:  Hormone replacement therapy, pain medication and anti-depressants.  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  Primary job tasks include:  Prolonged sitting, prolonged standing, lifting, repetitive tasks, operating a machine and driving.    Barriers include:  None as reported by the patient.    Red flags:  None as reported by the patient.           Knee Activity of Daily Living Score: 2.86            KNEE:    PROM:   L  R   Hyperextension     Extension      Flexion  120   AROM L knee: 3-0-130degs  AROM R knee: 0-5-117    Strength:   L R   HIP     Flex 5/5 4/5   Ext 4/5 nt   Abd 4/5 nt   KNEE     Flex 5/5 nt   Ext 5/5 nt     Hip PROM:     L R   IR nt nt   ER nt nt   Lana's nt nt   Warren nt nt       Special tests: deferred due to  Known surgery   L R   Anterior Drawer     Posterior Drawer     Lachman's     Valgus 0 degrees     Valgus 30 degrees     Varus 0 degrees     Varus 30 degrees     Petrona's     Appley's     Lateral Compression     Patellar Compression         Palpation: mild tenderness near R knee anterior incision.    Patellar tracking: minimal patella movement on R with slight lateral tracking    Functional: increased difficulty with sup<>sit and sit<>stand transfers    Gait: antalgic and slow using 2WW and step-to pattern on level. Pt able to negotiate stairs with handrail using step to pattern on ascent and descent.            Objective:    System    Physical Exam    General     ROS    Assessment/Plan:      Patient is a 52 year old female with right side knee complaints.    Patient has the following significant findings with corresponding treatment plan.                Diagnosis 1:  S/p R TKA  Pain -  hot/cold therapy, electric stimulation, manual therapy, splint/taping/bracing/orthotics, education and home program  Decreased ROM/flexibility - manual therapy, therapeutic exercise, therapeutic activity and home program  Decreased joint mobility - manual therapy, therapeutic exercise, therapeutic activity and home program  Decreased strength - therapeutic exercise, therapeutic activities and home program  Impaired gait - gait training, assistive devices and home program    Therapy Evaluation Codes:   1) History comprised of:   Personal factors that impact the plan of care:      None.    Comorbidity factors that impact the plan of care are:      none.     Medications impacting care: Pain.  2) Examination of Body Systems comprised of:   Body structures and  functions that impact the plan of care:      knee.   Activity limitations that impact the plan of care are:      Walking.  3) Clinical presentation characteristics are:   Stable/Uncomplicated.  4) Decision-Making    Low complexity using standardized patient assessment instrument and/or measureable assessment of functional outcome.  Cumulative Therapy Evaluation is: Low complexity.    Previous and current functional limitations:  (See Goal Flow Sheet for this information)    Short term and Long term goals: (See Goal Flow Sheet for this information)     Communication ability:  Patient appears to be able to clearly communicate and understand verbal and written communication and follow directions correctly.  Treatment Explanation - The following has been discussed with the patient:   RX ordered/plan of care  Anticipated outcomes  Possible risks and side effects  This patient would benefit from PT intervention to resume normal activities.   Rehab potential is good.    Frequency:  1 X week, once daily  Duration:  for 8 weeks  Discharge Plan:  Achieve all LTG.  Independent in home treatment program.  Return to previous functional level by discharge.  Reach maximal therapeutic benefit.    Please refer to the daily flowsheet for treatment today, total treatment time and time spent performing 1:1 timed codes.             Thank you for your referral.    INQUIRIES  Therapist: [facility] Guzman DPT [facility]YS[facility]  [address] [place] [address]  Phone: [phone]  Fax: [phone]

## 2017-08-29 NOTE — PROGRESS NOTES
[place] [facility] Initial Evaluation      Subjective:    Patient is a 52 year old female presenting with rehab right knee hpi.   Narcisa Quiles is a 52 year old female with a right knee condition.  Condition occurred with:  Degenerative joint disease.  Condition occurred: in the community.  This is a new condition  S/p R TKA on 8/22/17.    Patient reports pain:  Anterior.  Radiates to: none.  Pain is described as aching and is constant and reported as 6/10.  Associated symptoms:  Edema, loss of motion/stiffness and loss of strength. Pain is the same all the time.  Symptoms are exacerbated by activity, weight bearing, certain positions and walking and relieved by rest, ice and NSAID's.  Since onset symptoms are gradually improving.        General health as reported by patient is fair.  Pertinent medical history includes:  Overweight, osteoarthritis, depression and menopausal.  Medical allergies: no.  Other surgeries include:  Orthopedic surgery (L TKA 2011).  Current medications:  Hormone replacement therapy, pain medication and anti-depressants.  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  Primary job tasks include:  Prolonged sitting, prolonged standing, lifting, repetitive tasks, operating a machine and driving.    Barriers include:  None as reported by the patient.    Red flags:  None as reported by the patient.           Knee Activity of Daily Living Score: 2.86            KNEE:    PROM:   L  R   Hyperextension     Extension     Flexion  120   AROM L knee: 3-0-130degs  AROM R knee: 0-5-117    Strength:   L R   HIP     Flex 5/5 4/5   Ext 4/5 nt   Abd 4/5 nt   KNEE     Flex 5/5 nt   Ext 5/5 nt     Hip PROM:     L R   IR nt nt   ER nt nt   Lana's nt nt   Warren nt nt       Special tests: deferred due to  Known surgery   L R   Anterior Drawer     Posterior Drawer     Lachman's     Valgus 0 degrees     Valgus 30 degrees     Varus 0  degrees     Varus 30 degrees     Petrona's     Appley's     Lateral Compression     Patellar Compression         Palpation: mild tenderness near R knee anterior incision.    Patellar tracking: minimal patella movement on R with slight lateral tracking    Functional: increased difficulty with sup<>sit and sit<>stand transfers    Gait: antalgic and slow using 2WW and step-to pattern on level. Pt able to negotiate stairs with handrail using step to pattern on ascent and descent.            Objective:    System    Physical Exam    General     ROS    Assessment/Plan:      Patient is a 52 year old female with right side knee complaints.    Patient has the following significant findings with corresponding treatment plan.                Diagnosis 1:  S/p R TKA  Pain -  hot/cold therapy, electric stimulation, manual therapy, splint/taping/bracing/orthotics, education and home program  Decreased ROM/flexibility - manual therapy, therapeutic exercise, therapeutic activity and home program  Decreased joint mobility - manual therapy, therapeutic exercise, therapeutic activity and home program  Decreased strength - therapeutic exercise, therapeutic activities and home program  Impaired gait - gait training, assistive devices and home program    Therapy Evaluation Codes:   1) History comprised of:   Personal factors that impact the plan of care:      None.    Comorbidity factors that impact the plan of care are:      none.     Medications impacting care: Pain.  2) Examination of Body Systems comprised of:   Body structures and functions that impact the plan of care:      knee.   Activity limitations that impact the plan of care are:      Walking.  3) Clinical presentation characteristics are:   Stable/Uncomplicated.  4) Decision-Making    Low complexity using standardized patient assessment instrument and/or measureable assessment of functional outcome.  Cumulative Therapy Evaluation is: Low complexity.    Previous and current  functional limitations:  (See Goal Flow Sheet for this information)    Short term and Long term goals: (See Goal Flow Sheet for this information)     Communication ability:  Patient appears to be able to clearly communicate and understand verbal and written communication and follow directions correctly.  Treatment Explanation - The following has been discussed with the patient:   RX ordered/plan of care  Anticipated outcomes  Possible risks and side effects  This patient would benefit from PT intervention to resume normal activities.   Rehab potential is good.    Frequency:  1 X week, once daily  Duration:  for 8 weeks  Discharge Plan:  Achieve all LTG.  Independent in home treatment program.  Return to previous functional level by discharge.  Reach maximal therapeutic benefit.    Please refer to the daily flowsheet for treatment today, total treatment time and time spent performing 1:1 timed codes.

## 2017-08-29 NOTE — MR AVS SNAPSHOT
After Visit Summary   8/29/2017    Narcisa Quiles    MRN: [id number]           Patient Information     Date Of Birth          1965        Visit Information        Provider Department      8/29/2017 10:20 AM Vidal Guzman, PT [facility] [place] [facility]        Today's Diagnoses     Aftercare following right knee joint replacement surgery    -  1       Follow-ups after your visit        Your next 10 appointments already scheduled     Sep 01, 2017 10:20 AM CDT   JIM Extremity with Vidal Guzman, PT   [facility] [place] [facility] ([facility] [place]    [address]  [place] [address]   [phone]            Sep 06, 2017 11:00 AM CDT   Return Visit with Dale Sanchez MD   [facility] ([facility]    [address]  [place] [address]   [phone]            Sep 06, 2017  2:40 PM CDT   JIM Extremity with Luis Hardy ATC   [facility] [place] [facility] ([facility] [place]    [address]  [place] [address]   [phone]            Sep 15, 2017 11:40 AM CDT   JIM Extremity with Vidal Guzman, PT   [facility] [place] [facility] ([facility] [place])    [address]  [place] [address]   [phone]            Sep 22, 2017 11:40 AM CDT   JIM Extremity with Vidal Guzman, PT   [facility] [place] [facility] ([facility] [place])    [address]  [place] [address]   [phone]              Who to contact     If you have questions or need follow up information about today's clinic visit or your schedule please contact [facility] [place] [facility] directly at [phone].  Normal or non-critical lab and imaging results will be communicated to you by MyChart, letter or phone within 4 business days after the clinic has received the results. If you do not hear from us within 7 days, please contact the clinic through MyChart or phone. If you have a critical or abnormal lab result, we will notify you by phone as soon as possible.  Submit refill requests through  [employer] or call your pharmacy and they will forward the refill request to us. Please allow 3 business days for your refill to be completed.          Additional Information About Your Visit        MyChart Information     [employer] gives you secure access to your electronic health record. If you see a primary care provider, you can also send messages to your care team and make appointments. If you have questions, please call your primary care clinic.  If you do not have a primary care provider, please call [phone] and they will assist you.        Care EveryWhere ID     This is your Care EveryWhere ID. This could be used by other organizations to access your [place] medical records  [id number]         Blood Pressure from Last 3 Encounters:   08/08/17 130/86   05/12/17 (!) 143/96   03/03/17 124/82    Weight from Last 3 Encounters:   08/08/17 111.1 kg (245 lb)   06/19/17 111.6 kg (246 lb)   05/12/17 109.3 kg (241 lb)              We Performed the Following     HC PT EVAL, LOW COMPLEXITY     [facility]IM INITIAL EVAL REPORT     THERAPEUTIC EXERCISES        Primary Care Provider Office Phone # Fax #    [facility] [phone] [address] [address] [facility] [address]        Equal Access to Services     JARED TOVAR : Hadii preet [place] hadasho Soomaali, waaxda luqadaha, qaybta kaalmada adeegyada, sarika rivas. So [facility] [phone].    ATENCIÓN: Si habla español, tiene a owusu disposición servicios gratuitos de asistencia lingüística. Llame al [phone].    We comply with applicable federal civil rights laws and [place] laws. We do not discriminate on the basis of race, color, national origin, age, disability sex, sexual orientation or gender identity.            Thank you!     Thank you for choosing [facility]  for your care. Our goal is always to provide you with excellent care. Hearing back from our patients is one way we can continue to improve our  services. Please take a few minutes to complete the written survey that you may receive in the mail after your visit with us. Thank you!             Your Updated Medication List - Protect others around you: Learn how to safely use, store and throw away your medicines at www.disposemymeds.org.          This list is accurate as of: 8/29/17  1:03 PM.  Always use your most recent med list.                   Brand Name Dispense Instructions for use Diagnosis    buPROPion 100 MG 12 hr tablet    WELLBUTRIN SR    180 tablet    Take 1 tablet (100 mg) by mouth 2 times daily    Major depressive disorder, recurrent episode, moderate (H)       estradiol 0.5 MG tablet    ESTRACE    90 tablet    Take 1 tablet (0.5 mg) by mouth daily    Acquired absence of both cervix and uterus, Surgical menopause on hormone replacement therapy       FLUoxetine 20 MG capsule    PROzac    90 capsule    Take 1 capsule (20 mg) by mouth daily    Major depressive disorder, recurrent episode, moderate (H)

## 2017-09-01 ENCOUNTER — THERAPY VISIT (OUTPATIENT)
Dept: PHYSICAL THERAPY | Facility: CLINIC | Age: 52
End: 2017-09-01
Payer: COMMERCIAL

## 2017-09-01 DIAGNOSIS — Z47.1 AFTERCARE FOLLOWING RIGHT KNEE JOINT REPLACEMENT SURGERY: ICD-10-CM

## 2017-09-01 DIAGNOSIS — Z96.651 AFTERCARE FOLLOWING RIGHT KNEE JOINT REPLACEMENT SURGERY: ICD-10-CM

## 2017-09-01 PROCEDURE — 97110 THERAPEUTIC EXERCISES: CPT | Mod: GP | Performed by: PHYSICAL THERAPIST

## 2017-09-06 ENCOUNTER — OFFICE VISIT (OUTPATIENT)
Dept: ORTHOPEDICS | Facility: CLINIC | Age: 52
End: 2017-09-06
Payer: COMMERCIAL

## 2017-09-06 ENCOUNTER — THERAPY VISIT (OUTPATIENT)
Dept: PHYSICAL THERAPY | Facility: CLINIC | Age: 52
End: 2017-09-06
Payer: COMMERCIAL

## 2017-09-06 ENCOUNTER — RADIANT APPOINTMENT (OUTPATIENT)
Dept: GENERAL RADIOLOGY | Facility: CLINIC | Age: 52
End: 2017-09-06
Attending: PHYSICIAN ASSISTANT
Payer: COMMERCIAL

## 2017-09-06 VITALS — BODY MASS INDEX: 38.41 KG/M2 | RESPIRATION RATE: 16 BRPM | HEIGHT: 66 IN | WEIGHT: 239 LBS

## 2017-09-06 DIAGNOSIS — Z96.651 S/P TOTAL KNEE ARTHROPLASTY, RIGHT: ICD-10-CM

## 2017-09-06 DIAGNOSIS — Z96.651 AFTERCARE FOLLOWING RIGHT KNEE JOINT REPLACEMENT SURGERY: ICD-10-CM

## 2017-09-06 DIAGNOSIS — Z96.651 S/P TOTAL KNEE ARTHROPLASTY, RIGHT: Primary | ICD-10-CM

## 2017-09-06 DIAGNOSIS — Z47.1 AFTERCARE FOLLOWING RIGHT KNEE JOINT REPLACEMENT SURGERY: ICD-10-CM

## 2017-09-06 PROCEDURE — 97110 THERAPEUTIC EXERCISES: CPT | Mod: GP | Performed by: SPECIALIST/TECHNOLOGIST

## 2017-09-06 PROCEDURE — 73562 X-RAY EXAM OF KNEE 3: CPT | Mod: RT

## 2017-09-06 PROCEDURE — 99024 POSTOP FOLLOW-UP VISIT: CPT | Performed by: ORTHOPAEDIC SURGERY

## 2017-09-06 RX ORDER — OXYCODONE AND ACETAMINOPHEN 5; 325 MG/1; MG/1
1 TABLET ORAL EVERY 8 HOURS PRN
Qty: 40 TABLET | Refills: 0 | Status: SHIPPED | OUTPATIENT
Start: 2017-09-06 | End: 2017-10-06

## 2017-09-06 ASSESSMENT — ACTIVITIES OF DAILY LIVING (ADL)
WALK: ACTIVITY IS FAIRLY DIFFICULT
WEAKNESS: THE SYMPTOM AFFECTS MY ACTIVITY SLIGHTLY
HOW_WOULD_YOU_RATE_THE_OVERALL_FUNCTION_OF_YOUR_KNEE_DURING_YOUR_USUAL_DAILY_ACTIVITIES?: ABNORMAL
HOW_WOULD_YOU_RATE_THE_CURRENT_FUNCTION_OF_YOUR_KNEE_DURING_YOUR_USUAL_DAILY_ACTIVITIES_ON_A_SCALE_FROM_0_TO_100_WITH_100_BEING_YOUR_LEVEL_OF_KNEE_FUNCTION_PRIOR_TO_YOUR_INJURY_AND_0_BEING_THE_INABILITY_TO_PERFORM_ANY_OF_YOUR_USUAL_DAILY_ACTIVITIES?: 40
GO UP STAIRS: ACTIVITY IS FAIRLY DIFFICULT
GIVING WAY, BUCKLING OR SHIFTING OF KNEE: I DO NOT HAVE THE SYMPTOM
RAW_SCORE: 33
RISE FROM A CHAIR: ACTIVITY IS SOMEWHAT DIFFICULT
SQUAT: ACTIVITY IS VERY DIFFICULT
STAND: ACTIVITY IS FAIRLY DIFFICULT
SWELLING: THE SYMPTOM AFFECTS MY ACTIVITY SLIGHTLY
SIT WITH YOUR KNEE BENT: ACTIVITY IS SOMEWHAT DIFFICULT
KNEE_ACTIVITY_OF_DAILY_LIVING_SCORE: 47.14
GO DOWN STAIRS: ACTIVITY IS FAIRLY DIFFICULT
AS_A_RESULT_OF_YOUR_KNEE_INJURY,_HOW_WOULD_YOU_RATE_YOUR_CURRENT_LEVEL_OF_DAILY_ACTIVITY?: ABNORMAL
LIMPING: THE SYMPTOM AFFECTS MY ACTIVITY SLIGHTLY
STIFFNESS: THE SYMPTOM AFFECTS MY ACTIVITY MODERATELY
PAIN: THE SYMPTOM AFFECTS MY ACTIVITY MODERATELY
KNEEL ON THE FRONT OF YOUR KNEE: I AM UNABLE TO DO THE ACTIVITY
KNEE_ACTIVITY_OF_DAILY_LIVING_SUM: 33

## 2017-09-06 ASSESSMENT — PAIN SCALES - GENERAL: PAINLEVEL: MILD PAIN (3)

## 2017-09-06 NOTE — MR AVS SNAPSHOT
After Visit Summary   9/6/2017    Narcisa Quiles    MRN: [id number]           Patient Information     Date Of Birth          1965        Visit Information        Provider Department      9/6/2017 11:00 AM Dale Sacnhez MD [facility]        Today's Diagnoses     S/P total knee arthroplasty, right    -  1       Follow-ups after your visit        Follow-up notes from your care team     Return in about 4 weeks (around 10/4/2017) for Postop Visit.      Your next 10 appointments already scheduled     Sep 06, 2017  2:40 PM CDT   JIM Extremity with Luis Hardy, ATC   [facility] [place] [facility] ([facility] [place]    [address]  [place] [address]   [phone]            Sep 14, 2017 11:00 AM CDT   JIM Extremity with Vidal Guzman, PT   [facility] [place] [facility] ([facility] [place]    [address]  [place] [address]   [phone]            Sep 21, 2017 11:00 AM CDT   JIM Extremity with Vidal Guzman, PT   [facility] [place] [facility] ([facility] [place]    [address]  [place] [address]   [phone]              Who to contact     If you have questions or need follow up information about today's clinic visit or your schedule please contact [facility] directly at [phone].  Normal or non-critical lab and imaging results will be communicated to you by I.Systemshart, letter or phone within 4 business days after the clinic has received the results. If you do not hear from us within 7 days, please contact the clinic through I.Systemshart or phone. If you have a critical or abnormal lab result, we will notify you by phone as soon as possible.  Submit refill requests through Jacked or call your pharmacy and they will forward the refill request to us. Please allow 3 business days for your refill to be completed.          Additional Information About Your Visit        MyChart Information     Jacked gives you secure access to your electronic health record. If you see a  "primary care provider, you can also send messages to your care team and make appointments. If you have questions, please call your primary care clinic.  If you do not have a primary care provider, please call [phone] and they will assist you.        Care EveryWhere ID     This is your Care EveryWhere ID. This could be used by other organizations to access your [place] medical records  [id number]        Your Vitals Were     Respirations Height BMI (Body Mass Index)             16 5' 5.5\" (1.664 m) 39.17 kg/m2          Blood Pressure from Last 3 Encounters:   08/08/17 130/86   05/12/17 (!) 143/96   03/03/17 124/82    Weight from Last 3 Encounters:   09/06/17 239 lb (108.4 kg)   08/08/17 245 lb (111.1 kg)   06/19/17 246 lb (111.6 kg)                 Today's Medication Changes          These changes are accurate as of: 9/6/17 12:47 PM.  If you have any questions, ask your nurse or doctor.               Start taking these medicines.        Dose/Directions    oxyCODONE-acetaminophen 5-325 MG per tablet   Commonly known as:  PERCOCET   Used for:  S/P total knee arthroplasty, right   Started by:  Dale Sanchez MD        Dose:  1 tablet   Take 1 tablet by mouth every 8 hours as needed for pain maximum 3 tablet(s) per day   Quantity:  40 tablet   Refills:  0            Where to get your medicines      Some of these will need a paper prescription and others can be bought over the counter.  Ask your nurse if you have questions.     Bring a paper prescription for each of these medications     oxyCODONE-acetaminophen 5-325 MG per tablet                Primary Care Provider Office Phone # Fax #    [facility] [phone] [address] [facility]. [facility] [address]        Equal Access to Services     JARED TOVAR AH: Francesca Shea, waandre young, qaybta kaalmasarika snider. So [facility] [phone].    ATENCIÓN: Si katerina smith " owusu disposición servicios gratuitos de asistencia lingüística. Nghia patel [phone].    We comply with applicable federal civil rights laws and [place] laws. We do not discriminate on the basis of race, color, national origin, age, disability sex, sexual orientation or gender identity.            Thank you!     Thank you for choosing [facility]  for your care. Our goal is always to provide you with excellent care. Hearing back from our patients is one way we can continue to improve our services. Please take a few minutes to complete the written survey that you may receive in the mail after your visit with us. Thank you!             Your Updated Medication List - Protect others around you: Learn how to safely use, store and throw away your medicines at www.disposemymeds.org.          This list is accurate as of: 9/6/17 12:47 PM.  Always use your most recent med list.                   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

## 2017-09-06 NOTE — LETTER
[address] [address]  [phone]  WORKABILITY    [facility], Dr. Dale Bills [place], [place]        9/6/2017      RE: Narcisa Quiles    [address]  [facility] [address]        To whom it may concern:     Narcisa Quiles is under my care for   1. S/P total knee arthroplasty, right          Date of surgery: 8/22/17    Return to work date:     Ms. Quiles was seen in clinic today for a 2 week post operative appointment. No return to work at this time. She will return to clinic in 4 weeks for reassessment.       Next appointment: 4 weeks        Electronically Signed (as below)  Dr. Dale Sanchez M.D.

## 2017-09-06 NOTE — PROGRESS NOTES
Chief Complaint   Patient presents with     Surgical Followup     Right knee TKA. DOS: 8/22/17. DOing very well. Walking with a cane that isn't really necessary.       SURGERY: Total knee arthroplasty, right knee  DATE OF SURGERY: 8/22/2017    HISTORY OF PRESENT ILLNESS: Narcisa Quiles is a 52 year old female seen for postoperative evaluation of right total knee arthroplasty. It has been 2 weeks since surgery. Returns today stating she is doing better. She has a throbbing pain at night, which is when she takes pain medication. Is going to physical therapy, doing well. she is walking with a cane, which isn't really necessary. Denies any wound problems. Denies numbness, tingling, or weakness in the affected extremity. Denies fevers chills night sweats. Continues to take aspirin for anti-coagulation for deep vein thrombosis prophylaxis. Use of pain medications has been at night only and with physical therapy. She will take Tylenol and Aleve every 4 hours. Has been doing Physical Therapy. Concerns today include: refill on pain medication.    Present symptoms: mild pain.    Pain severity: 3/10  Pain quality: aching and throbbing  Frequency of symptoms: frequently  Exacerbating Factors: prolonged weightbearing,    Relieving Factors: rest, Aleve, Tylenol, pain medication  Night Pain: Yes  Pain while at rest: No   Associated numbness or tingling: No     Past medical history: History reviewed. No pertinent past medical history.  Patient Active Problem List    Diagnosis Date Noted     Aftercare following right knee joint replacement surgery 08/29/2017     Priority: Medium     History of total left knee replacement 09/14/2016     Priority: Medium     Major depressive disorder, recurrent episode, moderate (H) 05/13/2016     Priority: Medium     Other iron deficiency anemia 05/13/2016     Priority: Medium     Hyperlipidemia with target LDL less than 130 06/11/2014     Priority: Medium     Major depression 06/11/2014      "Priority: Medium       Past Surgical History:   Past Surgical History:   Procedure Laterality Date     HYSTERECTOMY VAGINAL, BILATERAL SALPINGO-OOPHERECTOMY, COMBINED         Medications:   Current Outpatient Prescriptions:      buPROPion (WELLBUTRIN SR) 100 MG 12 hr tablet, Take 1 tablet (100 mg) by mouth 2 times daily, Disp: 180 tablet, Rfl: 1     estradiol (ESTRACE) 0.5 MG tablet, Take 1 tablet (0.5 mg) by mouth daily, Disp: 90 tablet, Rfl: 1     FLUoxetine (PROZAC) 20 MG capsule, Take 1 capsule (20 mg) by mouth daily, Disp: 90 capsule, Rfl: 1    Allergies: No Known Allergies    REVIEW OF SYSTEMS:  CONSTITUTIONAL:NEGATIVE for fever, chills, night sweats, change in weight  INTEGUMENTARY/SKIN: NEGATIVE for worrisome rashes, moles or lesions  MUSCULOSKELETAL:See HPI above  NEURO: NEGATIVE for weakness, dizziness or paresthesias  CARDIOVASCULAR: negative for chest pain, shortness of breath    This document serves as a record of the services and decisions personally performed and made by Dale Sanchez MD. It was created on his behalf by Yumiko Ceballos, a trained medical scribe. The creation of this document is based the provider's statements to the medical scribe.    Scribe Yumiko Ceballos 11:28 AM 9/6/2017      PHYSICAL EXAM:  Resp 16  Ht 1.664 m (5' 5.5\")  Wt 108.4 kg (239 lb)  BMI 39.17 kg/m2   GENERAL APPEARANCE: healthy, alert, no distress  SKIN: no suspicious lesions or rashes  NEURO: Normal strength and tone, mentation intact and speech normal  PSYCH:  mentation appears normal and affect normal  RESPIRATORY: No increased work of breathing.    BILATERAL LOWER EXTREMITIES:  Gait: using walker for support, favors the right.  Intact sensation deep peroneal nerve, superficial peroneal nerve, med/lat tibial nerve, sural nerve, saphenous nerve  Intact EHL, EDL, TA, FHL, GS, quadriceps hamstrings and hip flexors  Toes warm and well perfused, brisk capillary refill. Palpable 2+ dp pulses.  Bilateral calf soft and nttp or " squeeze.  Edema: trace    right  KNEE EXAM:    Incision: healing well, skin well approximated, resolving ecchymosis  Skin: intact, no ecchymosis or erythema  ROM: 0 extension to 120 flexion  Effusion: moderate  Mild diffuse tender to palpation.    X-RAY:  3 views right knee from 9/6/2017 were reviewed in clinic today. No obvious fractures or dislocations. Total knee arthroplasty components in place without evidence of failure or loosening.    Impression: Narcisa Quiles is a 52 year old female status post Total knee arthroplasty, right knee, doing well, 2 weeks out from surgery.    Plan:   * continue DVT prophylaxis for a total of 4 weeks postoperative  * continue with knee range of motion exercises, focusing on extension  * wean off all pain medications as tolerated  * xrays next visit: 2 views of the knee  * return to clinic 4 weeks   * Medication: Percocet prescribed.  * Physical Therapy, home exercise program.  * all questions addressed and answered prior to discharge from clinic today to patient's satisfaction.  * workability update: no return to work at this time. Will reassess in 4 weeks time.  * patient will need longterm prophylactic antibiotics use with dental procedures or other invasive procedures (2 g amoxicilin or 450mg ([id number]) clindamycin) 1 hour prior to dental procedures.    * Oxford Knee score: NOT done today. TO BE COMPLETED at 3 month follow up.    The information in this document, created by a scribe for me, accurately reflects the services I personally performed and the decisions made by me. I have reviewed and approved this document for accuracy.      Dale Sanchez M.D., M.S.  Dept. of Orthopaedic Surgery  [facility]

## 2017-09-14 ENCOUNTER — THERAPY VISIT (OUTPATIENT)
Dept: PHYSICAL THERAPY | Facility: CLINIC | Age: 52
End: 2017-09-14
Payer: COMMERCIAL

## 2017-09-14 DIAGNOSIS — Z47.1 AFTERCARE FOLLOWING RIGHT KNEE JOINT REPLACEMENT SURGERY: ICD-10-CM

## 2017-09-14 DIAGNOSIS — Z96.651 AFTERCARE FOLLOWING RIGHT KNEE JOINT REPLACEMENT SURGERY: ICD-10-CM

## 2017-09-14 PROCEDURE — 97110 THERAPEUTIC EXERCISES: CPT | Mod: GP | Performed by: PHYSICAL THERAPIST

## 2017-09-21 ENCOUNTER — THERAPY VISIT (OUTPATIENT)
Dept: PHYSICAL THERAPY | Facility: CLINIC | Age: 52
End: 2017-09-21
Payer: COMMERCIAL

## 2017-09-21 DIAGNOSIS — Z96.651 AFTERCARE FOLLOWING RIGHT KNEE JOINT REPLACEMENT SURGERY: ICD-10-CM

## 2017-09-21 DIAGNOSIS — Z47.1 AFTERCARE FOLLOWING RIGHT KNEE JOINT REPLACEMENT SURGERY: ICD-10-CM

## 2017-09-21 PROCEDURE — 97110 THERAPEUTIC EXERCISES: CPT | Mod: GP | Performed by: PHYSICAL THERAPIST

## 2017-09-21 NOTE — MR AVS SNAPSHOT
After Visit Summary   9/21/2017    Narcisa Quiles    MRN: [id number]           Patient Information     Date Of Birth          1965        Visit Information        Provider Department      9/21/2017 11:00 AM Vidal Guzman PT [facility]        Today's Diagnoses     Aftercare following right knee joint replacement surgery           Follow-ups after your visit        Your next 10 appointments already scheduled     Oct 05, 2017 11:00 AM CDT   [facility] Extremity with [facility] ([facility] [place]    [address]  [place] [address]   [phone]            Oct 06, 2017  9:00 AM CDT   Return Visit with Dale Sanchez MD   [facility] ([address]  [place] [address] [phone]            Oct 27, 2017  9:00 AM CDT   [facility] Extremity with [facility] ([facility] [place])    [address]  [place] [address] [phone]              Who to contact     If you have questions or need follow up information about today's clinic visit or your schedule please contact [facility] directly at [phone].  Normal or non-critical lab and imaging results will be communicated to you by [facility], letter or phone within 4 business days after the clinic has received the results. If you do not hear from us within 7 days, please contact the clinic through MyChart or phone. If you have a critical or abnormal lab result, we will notify you by phone as soon as possible.  Submit refill requests through Colatris or call your pharmacy and they will forward the refill request to us. Please allow 3 business days for your refill to be completed.          Additional Information About Your Visit        [facility] Information     Colatris gives you secure access to your electronic health record. If you see a primary care provider, you can also send messages to your care  team and make appointments. If you have questions, please call your primary care clinic.  If you do not have a primary care provider, please call [phone] and they will assist you.        Care EveryWhere ID     This is your Care EveryWhere ID. This could be used by other organizations to access your [place] medical records  [id number]         Blood Pressure from Last 3 Encounters:   08/08/17 130/86   05/12/17 (!) 143/96   03/03/17 124/82    Weight from Last 3 Encounters:   09/06/17 108.4 kg (239 lb)   08/08/17 111.1 kg (245 lb)   06/19/17 111.6 kg (246 lb)              We Performed the Following     THERAPEUTIC EXERCISES        Primary Care Provider Office Phone # Fax #    [facility] [phone] [address] [facility] [address]        Equal Access to Services     JARED TOVAR : Hadii aad ku hadasho Sokasandra, waaxda luqadaha, qaybta kaalmada adetennilleyada, sarika ward . So [facility] [phone].    ATENCIÓN: Si habla español, tiene a owusu disposición servicios gratuitos de asistencia lingüística. Nghia al [phone].    We comply with applicable federal civil rights laws and [place] laws. We do not discriminate on the basis of race, color, national origin, age, disability sex, sexual orientation or gender identity.            Thank you!     Thank you for choosing [facility]  for your care. Our goal is always to provide you with excellent care. Hearing back from our patients is one way we can continue to improve our services. Please take a few minutes to complete the written survey that you may receive in the mail after your visit with us. Thank you!             Your Updated Medication List - Protect others around you: Learn how to safely use, store and throw away your medicines at www.disposemymeds.org.          This list is accurate as of: 9/21/17 11:54 AM.  Always use your most recent med list.                   Brand Name  Dispense Instructions for use Diagnosis    buPROPion 100 MG 12 hr tablet    WELLBUTRIN SR    180 tablet    Take 1 tablet (100 mg) by mouth 2 times daily    Major depressive disorder, recurrent episode, moderate (H)       estradiol 0.5 MG tablet    ESTRACE    90 tablet    Take 1 tablet (0.5 mg) by mouth daily    Acquired absence of both cervix and uterus, Surgical menopause on hormone replacement therapy       FLUoxetine 20 MG capsule    PROzac    90 capsule    Take 1 capsule (20 mg) by mouth daily    Major depressive disorder, recurrent episode, moderate (H)       oxyCODONE-acetaminophen 5-325 MG per tablet    PERCOCET    40 tablet    Take 1 tablet by mouth every 8 hours as needed for pain maximum 3 tablet(s) per day    S/P total knee arthroplasty, right

## 2017-10-06 ENCOUNTER — RADIANT APPOINTMENT (OUTPATIENT)
Dept: GENERAL RADIOLOGY | Facility: CLINIC | Age: 52
End: 2017-10-06
Attending: ORTHOPAEDIC SURGERY
Payer: COMMERCIAL

## 2017-10-06 ENCOUNTER — OFFICE VISIT (OUTPATIENT)
Dept: ORTHOPEDICS | Facility: CLINIC | Age: 52
End: 2017-10-06
Payer: COMMERCIAL

## 2017-10-06 VITALS — WEIGHT: 240 LBS | BODY MASS INDEX: 38.57 KG/M2 | HEIGHT: 66 IN | RESPIRATION RATE: 16 BRPM

## 2017-10-06 DIAGNOSIS — Z96.651 STATUS POST TOTAL RIGHT KNEE REPLACEMENT: Primary | ICD-10-CM

## 2017-10-06 DIAGNOSIS — Z96.651 STATUS POST TOTAL RIGHT KNEE REPLACEMENT: ICD-10-CM

## 2017-10-06 PROCEDURE — 99024 POSTOP FOLLOW-UP VISIT: CPT | Performed by: ORTHOPAEDIC SURGERY

## 2017-10-06 PROCEDURE — 73560 X-RAY EXAM OF KNEE 1 OR 2: CPT | Mod: RT

## 2017-10-06 ASSESSMENT — PAIN SCALES - GENERAL: PAINLEVEL: SEVERE PAIN (6)

## 2017-10-06 NOTE — NURSING NOTE
"Chief Complaint   Patient presents with     Surgical Followup     Right TKA DOS 8/22/17 Pt states she was doing good, knee aches at NOC, but on Monday or Tuesday fell on right knee, while walking since fall has sharp pain in knee, Pt has been doing PT and Ibuprofen and since fall has been applying ice.       Initial Resp 16  Ht 1.664 m (5' 5.5\")  Wt 108.9 kg (240 lb)  BMI 39.33 kg/m2 Estimated body mass index is 39.33 kg/(m^2) as calculated from the following:    Height as of this encounter: 1.664 m (5' 5.5\").    Weight as of this encounter: 108.9 kg (240 lb).  Medication Reconciliation: giorgi Abreu CMA 10/6/2017 9:06 AM      "

## 2017-10-06 NOTE — PROGRESS NOTES
Chief Complaint   Patient presents with     Surgical Followup     Right TKA DOS 8/22/17 Pt states she was doing good, knee aches at NOC, but on Monday or Tuesday fell on right knee, while walking since fall has sharp pain in knee, Pt has been doing PT and Ibuprofen and since fall has been applying ice.       SURGERY: Total knee arthroplasty, right knee  DATE OF SURGERY: 8/22/2017    HISTORY OF PRESENT ILLNESS: Narcisa Quiles is a 52 year old female seen for postoperative evaluation of right total knee arthroplasty. It has been 6 weeks since surgery. She reports she was doing great until her recent fall. On October 2 or 3, she was walking, tripped and fell, landing onto her lateral knee. She presents today with severe pain, rated a 6/10. Her pain is aching and is mostly at night and is over the lateral knee where she fell. She has been doing physical therapy and taking ibuprofen since the fall and has been applying ice over her knee.     Present symptoms: mild pain.    Pain severity: 3/10 to 6/10.  Pain quality: aching and throbbing  Frequency of symptoms: frequently  Exacerbating Factors: prolonged weightbearing,    Relieving Factors: rest, Aleve, Tylenol, pain medication  Night Pain: Yes  Pain while at rest: No   Associated numbness or tingling: No     Past medical history: No past medical history on file.  Patient Active Problem List    Diagnosis Date Noted     Aftercare following right knee joint replacement surgery 08/29/2017     Priority: Medium     History of total left knee replacement 09/14/2016     Priority: Medium     Major depressive disorder, recurrent episode, moderate (H) 05/13/2016     Priority: Medium     Other iron deficiency anemia 05/13/2016     Priority: Medium     Hyperlipidemia with target LDL less than 130 06/11/2014     Priority: Medium     Major depression 06/11/2014     Priority: Medium       Past Surgical History:   Past Surgical History:   Procedure Laterality Date     HYSTERECTOMY  "VAGINAL, BILATERAL SALPINGO-OOPHERECTOMY, COMBINED         Medications:   Current Outpatient Prescriptions:      buPROPion (WELLBUTRIN SR) 100 MG 12 hr tablet, Take 1 tablet (100 mg) by mouth 2 times daily, Disp: 180 tablet, Rfl: 1     estradiol (ESTRACE) 0.5 MG tablet, Take 1 tablet (0.5 mg) by mouth daily, Disp: 90 tablet, Rfl: 1     FLUoxetine (PROZAC) 20 MG capsule, Take 1 capsule (20 mg) by mouth daily, Disp: 90 capsule, Rfl: 1    Allergies: No Known Allergies    REVIEW OF SYSTEMS:  CONSTITUTIONAL:NEGATIVE for fever, chills, night sweats, change in weight  INTEGUMENTARY/SKIN: NEGATIVE for worrisome rashes, moles or lesions  MUSCULOSKELETAL:See HPI above  NEURO: NEGATIVE for weakness, dizziness or paresthesias  CARDIOVASCULAR: negative for chest pain, shortness of breath    This document serves as a record of the services and decisions personally performed and made by Dale Sanchez MD. It was created on his behalf by Yumiko Ceballos, a trained medical scribe. The creation of this document is based the provider's statements to the medical scribe.    Scribe Yumiko Ceballos 9:19 AM 10/6/2017     PHYSICAL EXAM:  Resp 16  Ht 1.664 m (5' 5.5\")  Wt 108.9 kg (240 lb)  BMI 39.33 kg/m2   GENERAL APPEARANCE: healthy, alert, no distress  SKIN: no suspicious lesions or rashes  NEURO: Normal strength and tone, mentation intact and speech normal  PSYCH:  mentation appears normal and affect normal  RESPIRATORY: No increased work of breathing.    BILATERAL LOWER EXTREMITIES:  Gait: slight favors the right.    Right lower extremity:  Intact sensation deep peroneal nerve, superficial peroneal nerve, med/lat tibial nerve, sural nerve, saphenous nerve  Intact EHL, EDL, TA, FHL, GS, quadriceps hamstrings and hip flexors  Toes warm and well perfused, brisk capillary refill. Palpable 2+ dp pulses.  calf soft and nttp or squeeze.  Edema: trace    right  KNEE EXAM:    Incision: healed well  Skin: intact, no ecchymosis or erythema  ROM: 0 " extension to 120 flexion  Effusion: trace  Mild diffuse tender to palpation lateral knee.    X-RAY:  3 views right knee from 9/6/2017 were reviewed in clinic today. No obvious fractures or dislocations. Total knee arthroplasty components in place without evidence of failure or loosening.    Impression: Narcisa Quiles is a 52 year old female status post Total knee arthroplasty, right knee, doing well, 6 weeks out from surgery.    Plan:   * continue with physical therapy per protocol.  * wean off all pain medications as tolerated  * xrays next visit: 2 views of the knee  * return to clinic 6 weeks   * Medication: Percocet prescribed.  * Physical Therapy, home exercise program.  * all questions addressed and answered prior to discharge from clinic today to patient's satisfaction.  * workability update: can return on 10/16/2017 with light duty restrictions; limit 20 hours/week;. Then can return to work with no restrictions on 10/30/2017.   invasive procedures (2 g amoxicilin or 450mg ([id number]) clindamycin) 1 hour prior to dental procedures.    * Oxford Knee score: NOT done today.  TO BE COMPLETED at 3 month follow up.    The information in this document, created by a scribe for me, accurately reflects the services I personally performed and the decisions made by me. I have reviewed and approved this document for accuracy.      Dale Sanchez M.D., M.S.  Dept. of Orthopaedic Surgery  [facility]

## 2017-10-06 NOTE — MR AVS SNAPSHOT
After Visit Summary   10/6/2017    Narcisa Quiles    MRN: [id number]           Patient Information     Date Of Birth          1965        Visit Information        Provider Department      10/6/2017 9:00 AM Dale Sanchez MD [facility]        Today's Diagnoses     Status post total right knee replacement    -  1       Follow-ups after your visit        Follow-up notes from your care team     Return in about 6 weeks (around 11/17/2017) for Postop Visit.      Your next 10 appointments already scheduled     Oct 27, 2017  9:00 AM CDT   JIM Extremity with Vidal Guzman, PT   [facility] ([facility] [place])    [address]  [place] [address] [phone]              Who to contact     If you have questions or need follow up information about today's clinic visit or your schedule please contact [facility] directly at [phone].  Normal or non-critical lab and imaging results will be communicated to you by [employer], letter or phone within 4 business days after the clinic has received the results. If you do not hear from us within 7 days, please contact the clinic through [employer] or phone. If you have a critical or abnormal lab result, we will notify you by phone as soon as possible.  Submit refill requests through [email or web address] or call your pharmacy and they will forward the refill request to us. Please allow 3 business days for your refill to be completed.          Additional Information About Your Visit        MyChart Information     [email or web address] gives you secure access to your electronic health record. If you see a primary care provider, you can also send messages to your care team and make appointments. If you have questions, please call your primary care clinic.  If you do not have a primary care provider, please call [phone] and they will assist you.        Care EveryWhere ID     This is your Care EveryWhere ID. This could be used by other  "organizations to access your [facility] medical records  [id number]        Your Vitals Were     Respirations Height BMI (Body Mass Index)             16 5' 5.5\" (1.664 m) 39.33 kg/m2          Blood Pressure from Last 3 Encounters:   08/08/17 130/86   05/12/17 (!) 143/96   03/03/17 124/82    Weight from Last 3 Encounters:   10/06/17 240 lb (108.9 kg)   09/06/17 239 lb (108.4 kg)   08/08/17 245 lb (111.1 kg)               Primary Care Provider Office Phone # Fax #    [facility] [phone] [address] [facility] [address]        Equal Access to Services     JARED TOVAR : Francesca palacioso Soginali, waaxda luqadaha, qaybta kaalmada adeegyada, sarika rivas. So [facility] [phone].    ATENCIÓN: Si habla español, tiene a owusu disposición servicios gratuitos de asistencia lingüística. Ll[facility] [phone].    We comply with applicable federal civil rights laws and [place] laws. We do not discriminate on the basis of race, color, national origin, age, disability, sex, sexual orientation, or gender identity.            Thank you!     Thank you for choosing [facility]  for your care. Our goal is always to provide you with excellent care. Hearing back from our patients is one way we can continue to improve our services. Please take a few minutes to complete the written survey that you may receive in the mail after your visit with us. Thank you!             Your Updated Medication List - Protect others around you: Learn how to safely use, store and throw away your medicines at www.disposemymeds.org.          This list is accurate as of: 10/6/17  3:40 PM.  Always use your most recent med list.                   Brand Name Dispense Instructions for use Diagnosis    buPROPion 100 MG 12 hr tablet    WELLBUTRIN SR    180 tablet    Take 1 tablet (100 mg) by mouth 2 times daily    Major depressive disorder, recurrent episode, moderate (H)       " estradiol 0.5 MG tablet    ESTRACE    90 tablet    Take 1 tablet (0.5 mg) by mouth daily    Acquired absence of both cervix and uterus, Surgical menopause on hormone replacement therapy       FLUoxetine 20 MG capsule    PROzac    90 capsule    Take 1 capsule (20 mg) by mouth daily    Major depressive disorder, recurrent episode, moderate (H)

## 2017-10-09 ENCOUNTER — TELEPHONE (OUTPATIENT)
Dept: FAMILY MEDICINE | Facility: CLINIC | Age: 52
End: 2017-10-09

## 2017-10-09 DIAGNOSIS — Z11.59 NEED FOR HEPATITIS C SCREENING TEST: Primary | ICD-10-CM

## 2017-10-09 NOTE — TELEPHONE ENCOUNTER
Patient calling, she would like to come in for Hepatitis C screening. She is wondering if this can be ordered.

## 2017-10-09 NOTE — TELEPHONE ENCOUNTER
Please review lab orders sign and close encounter. Irma Isidro MA/JUANA    Patient requesting Hep C

## 2017-10-11 ENCOUNTER — ALLIED HEALTH/NURSE VISIT (OUTPATIENT)
Dept: NURSING | Facility: CLINIC | Age: 52
End: 2017-10-11
Payer: COMMERCIAL

## 2017-10-11 DIAGNOSIS — Z11.59 NEED FOR HEPATITIS C SCREENING TEST: ICD-10-CM

## 2017-10-11 DIAGNOSIS — Z23 NEED FOR PROPHYLACTIC VACCINATION AND INOCULATION AGAINST INFLUENZA: Primary | ICD-10-CM

## 2017-10-11 PROCEDURE — 36415 COLL VENOUS BLD VENIPUNCTURE: CPT | Performed by: FAMILY MEDICINE

## 2017-10-11 PROCEDURE — 90686 IIV4 VACC NO PRSV 0.5 ML IM: CPT

## 2017-10-11 PROCEDURE — 99207 ZZC NO CHARGE NURSE ONLY: CPT

## 2017-10-11 PROCEDURE — 90471 IMMUNIZATION ADMIN: CPT

## 2017-10-11 PROCEDURE — 86803 HEPATITIS C AB TEST: CPT | Performed by: FAMILY MEDICINE

## 2017-10-11 NOTE — MR AVS SNAPSHOT
After Visit Summary   10/11/2017    Narcisa Quiles    MRN: [id number]           Patient Information     Date Of Birth          1965        Visit Information        Provider Department      10/11/2017 2:30 PM AN FLU CLINIC [facility]        Today's Diagnoses     Need for prophylactic vaccination and inoculation against influenza    -  1       Follow-ups after your visit        Your next 10 appointments already scheduled     Oct 11, 2017  2:30 PM CDT   [facility] with [facility] CL[facility]   [facility]    [address] [facility] [id number] [phone]            Oct 11, 2017  2:45 PM CDT   LAB with AN LAB   [facility]    [address] [address] [facility] [address] [phone]           Patient must bring picture ID. Patient should be prepared to give a urine specimen  Please do not eat 10-12 hours before your appointment if you are coming in fasting for labs on lipids, cholesterol, or glucose (sugar). Pregnant women should follow their Care Team instructions. Water with medications is okay. Do not drink coffee or other fluids. If you have concerns about taking  your medications, please ask at office or if scheduling via [employer], send a message by clicking on Secure Messaging, Message Your Care Team.            Oct 12, 2017  1:30 PM CDT   Nurse Only with AN ANCILLARY   [facility]    [address] [facility] [address] [facility] [id number] [phone]            Oct 27, 2017  9:00 AM CDT   JIM Extremity with Vidal Guzman, PT   [facility]    [address] [place] [id number] [phone]              Who to contact     If you have questions or need follow up information about today's clinic visit or your schedule please contact [facility] directly at [phone].  Normal or non-critical lab and imaging results  will be communicated to you by MyChart, letter or phone within 4 business days after the clinic has received the results. If you do not hear from us within 7 days, please contact the clinic through [employer] or phone. If you have a critical or abnormal lab result, we will notify you by phone as soon as possible.  Submit refill requests through [employer] or call your pharmacy and they will forward the refill request to us. Please allow 3 business days for your refill to be completed.          Additional Information About Your Visit        [employer] Information     [employer] gives you secure access to your electronic health record. If you see a primary care provider, you can also send messages to your care team and make appointments. If you have questions, please call your primary care clinic.  If you do not have a primary care provider, please call [phone] and they will assist you.        Care EveryWhere ID     This is your Care EveryWhere ID. This could be used by other organizations to access your [place] medical records  [id number]         Blood Pressure from Last 3 Encounters:   08/08/17 130/86   05/12/17 (!) 143/96   03/03/17 124/82    Weight from Last 3 Encounters:   10/06/17 240 lb (108.9 kg)   09/06/17 239 lb (108.4 kg)   08/08/17 245 lb (111.1 kg)              We Performed the Following     FLU VAC, SPLIT VIRUS IM > 3 YO (QUADRIVALENT) [92510]     Vaccine Administration, Initial [71485]        Primary Care Provider    Physician No Ref-Primary       NO REF-PRIMARY PHYSICIAN        Equal Access to Services     JARED TOVAR : Hadii preet palacioso Sokasandra, waaxda luqadaha, qaybta kaalmada sarika osman . So [facility] [phone].    ATENCIÓN: Si habla español, tiene a owusu disposición servicios gratuitos de asistencia lingüística. Llame al [phone].    We comply with applicable federal civil rights laws and [place] laws. We do not discriminate on the basis of race, color,  national origin, age, disability, sex, sexual orientation, or gender identity.            Thank you!     [facility]. Our goal is always to provide you with excellent care. Hearing back from our patients is one way we can continue to improve our services. Please take a few minutes to complete the written survey that you may receive in the mail after your visit with us. Thank you!             Your Updated Medication List - Protect others around you: Learn how to safely use, store and throw away your medicines at www.disposemymeds.org.          This list is accurate as of: 10/11/17  2:28 PM.  Always use your most recent med list.                   Brand Name Dispense Instructions for use Diagnosis    buPROPion 100 MG 12 hr tablet    WELLBUTRIN SR    180 tablet    Take 1 tablet (100 mg) by mouth 2 times daily    Major depressive disorder, recurrent episode, moderate (H)       estradiol 0.5 MG tablet    ESTRACE    90 tablet    Take 1 tablet (0.5 mg) by mouth daily    Acquired absence of both cervix and uterus, Surgical menopause on hormone replacement therapy       FLUoxetine 20 MG capsule    PROzac    90 capsule    Take 1 capsule (20 mg) by mouth daily    Major depressive disorder, recurrent episode, moderate (H)

## 2017-10-11 NOTE — PROGRESS NOTES
Injectable Influenza Immunization Documentation    1.  Is the person to be vaccinated sick today?   No    2. Does the person to be vaccinated have an allergy to a component   of the vaccine?   No    3. Has the person to be vaccinated ever had a serious reaction   to influenza vaccine in the past?   No    4. Has the person to be vaccinated ever had Guillain-Barré syndrome?   No    Form completed by Candace Marcum MA

## 2017-10-12 LAB — HCV AB SERPL QL IA: NONREACTIVE

## 2017-11-09 PROBLEM — Z96.651 AFTERCARE FOLLOWING RIGHT KNEE JOINT REPLACEMENT SURGERY: Status: RESOLVED | Noted: 2017-08-29 | Resolved: 2017-11-09

## 2017-11-09 PROBLEM — Z47.1 AFTERCARE FOLLOWING RIGHT KNEE JOINT REPLACEMENT SURGERY: Status: RESOLVED | Noted: 2017-08-29 | Resolved: 2017-11-09

## 2017-11-09 NOTE — PROGRESS NOTES
Subjective:    HPI                    Objective:    System    Physical Exam    General     ROS    Assessment/Plan:      DISCHARGE REPORT    Progress reporting period is from 8/29/17 to 9/21/17.     SUBJECTIVE  Subjective: pt reports ROM and strength are good and continue to improve but is frustrated with the mild constant pain. did some shopping including continuous walking for 40mins but was very tired after without pain.   Current Pain level: 4/10   Initial Pain level: 6/10   Changes in function: Yes, see goal flow sheet for change in function   Adverse reactions: None;   ,     The objective findings are from DOS 9/14/17.    OBJECTIVE  AROM R knee: 0-0-139degs.    ASSESSMENT/PLAN  Updated problem list and treatment plan: Diagnosis 1:  S/p R TKA  STG/LTGs have been met or progress has been made towards goals:  Yes, able to walk 40mins painfree.  Assessment of Progress: The patient's condition is improving.  The patient's condition has potential to improve.  Self Management Plans:  Patient has been instructed in a home treatment program.  Narcisa continues to require the following intervention to meet STG and LTG's: HEP  We will discharge this patient from PT.    Recommendations:  Pt has not returned to clinic since 9/21/17, at that time she was doing well in regards to pain at rest and during activity including prolonged walking, AROM R knee, and progressing as expected with BLE strengthening. Current status is unknown and plan to disch PT services.    Please refer to the daily flowsheet for treatment today, total treatment time and time spent performing 1:1 timed codes.

## 2018-06-01 NOTE — PROGRESS NOTES
SUBJECTIVE:   CC: Narcisa Quiles is an 53 year old woman who presents for preventive health visit.     Healthy Habits:    Answers for HPI/ROS submitted by the patient on 6/8/2018   Annual Exam:  Getting at least 3 servings of Calcium per day:: Yes  Bi-annual eye exam:: Yes  Dental care twice a year:: Yes  Sleep apnea or symptoms of sleep apnea:: None  Diet:: Regular (no restrictions)  Frequency of exercise:: 1 day/week  Taking medications regularly:: Yes  Medication side effects:: Not applicable  PHQ-2 Score: 0  Duration of exercise:: Less than 15 minutes    Discuss dosage for prozac, was taking 20 mg but felt more depressed and cried a lot so started taking 2 (40 mg).  Would like to increase to this dose.  Denies suicidal or homicidal thoughts.  Patient instructed to go to the emergency room or call 911 if these occur.    Paps not indicated-hysterectomy.     Colon cancer screening due per pt.    Obese-has a hard time losing weight. Mom with hypothyroidism, patient would like this checked today.     Anemia-h/o bleeding ulcer, will recheck hg.       Today's PHQ-2 Score:   PHQ-2 ( 1999 Pfizer) 3/3/2017 2/28/2017   Q1: Little interest or pleasure in doing things 0 -   Q2: Feeling down, depressed or hopeless 0 -   PHQ-2 Score 0 -   Q1: Little interest or pleasure in doing things - Not at all   Q2: Feeling down, depressed or hopeless - Not at all   PHQ-2 Score - 0       Abuse: Current or Past(Physical, Sexual or Emotional)- No  Do you feel safe in your environment - Yes    Social History   Substance Use Topics     Smoking status: Former Smoker     Types: Cigarettes     Start date: 6/6/2009     Smokeless tobacco: Never Used     Alcohol use Yes      Comment: Occasional     If you drink alcohol do you typically have >3 drinks per day or >7 drinks per week? No                     Reviewed orders with patient.  Reviewed health maintenance and updated orders accordingly - Yes  BP Readings from Last 3 Encounters:    06/08/18 138/88   08/08/17 130/86   05/12/17 (!) 143/96    Wt Readings from Last 3 Encounters:   06/08/18 239 lb (108.4 kg)   10/06/17 240 lb (108.9 kg)   09/06/17 239 lb (108.4 kg)                  Patient Active Problem List   Diagnosis     Hyperlipidemia with target LDL less than 130     Major depression     Major depressive disorder, recurrent episode, moderate (H)     Other iron deficiency anemia     History of total left knee replacement     Advanced directives, counseling/discussion     Family history of thyroid disease     Past Surgical History:   Procedure Laterality Date     HYSTERECTOMY VAGINAL, BILATERAL SALPINGO-OOPHERECTOMY, COMBINED         Social History   Substance Use Topics     Smoking status: Former Smoker     Types: Cigarettes     Start date: 6/6/2009     Smokeless tobacco: Never Used     Alcohol use Yes      Comment: Occasional     Family History   Problem Relation Age of Onset     Thyroid Disease Mother      CANCER Mother      Hypertension Father      HEART DISEASE Father      Eye Surgery Father      cataracts     Hypertension Maternal Grandmother      Alzheimer Disease Maternal Grandmother      Prostate Cancer Maternal Grandfather      HEART DISEASE Maternal Grandfather      CANCER Maternal Grandfather      Alzheimer Disease Paternal Grandmother      Hypertension Sister      Depression Daughter      Macular Degeneration No family hx of      Retinal detachment No family hx of          Current Outpatient Prescriptions   Medication Sig Dispense Refill     buPROPion (WELLBUTRIN SR) 100 MG 12 hr tablet Take 1 tablet (100 mg) by mouth 2 times daily 180 tablet 3     estradiol (ESTRACE) 0.5 MG tablet Take 1 tablet (0.5 mg) by mouth daily 90 tablet 3     FLUoxetine (PROZAC) 40 MG capsule Take 1 capsule (40 mg) by mouth daily Note increase in dose 90 capsule 3     [DISCONTINUED] buPROPion (WELLBUTRIN SR) 100 MG 12 hr tablet Take 1 tablet (100 mg) by mouth 2 times daily 180 tablet 1     [DISCONTINUED]  "estradiol (ESTRACE) 0.5 MG tablet Take 1 tablet (0.5 mg) by mouth daily 90 tablet 1     [DISCONTINUED] FLUoxetine (PROZAC) 20 MG capsule Take 1 capsule (20 mg) by mouth daily 90 capsule 1       Patient over age 50, mutual decision to screen reflected in health maintenance.    Pertinent mammograms are reviewed under the imaging tab.  History of abnormal Pap smear: NO - age 30- 65 PAP every 3 years recommended    Reviewed and updated as needed this visit by clinical staff         Reviewed and updated as needed this visit by Provider        Past Medical History:   Diagnosis Date     Depressive disorder       Past Surgical History:   Procedure Laterality Date     HYSTERECTOMY VAGINAL, BILATERAL SALPINGO-OOPHERECTOMY, COMBINED       Obstetric History     No data available          ROS:  CONSTITUTIONAL: NEGATIVE for fever, chills, change in weight  INTEGUMENTARY/SKIN: NEGATIVE for worrisome rashes, moles or lesions  EYES: NEGATIVE for vision changes or irritation  ENT: NEGATIVE for ear, mouth and throat problems  RESP: NEGATIVE for significant cough or SOB  BREAST: NEGATIVE for masses, tenderness or discharge  CV: NEGATIVE for chest pain, palpitations or peripheral edema  GI: NEGATIVE for nausea, abdominal pain, heartburn, or change in bowel habits  : NEGATIVE for unusual urinary or vaginal symptoms. No vaginal bleeding.  MUSCULOSKELETAL: NEGATIVE for significant arthralgias or myalgia  NEURO: NEGATIVE for weakness, dizziness or paresthesias  PSYCHIATRIC: NEGATIVE for changes in mood or affect     OBJECTIVE:   /88  Pulse 87  Temp 100.3  F (37.9  C) (Oral)  Resp 18  Ht 5' 5.5\" (1.664 m)  Wt 239 lb (108.4 kg)  SpO2 97%  Breastfeeding? No  BMI 39.17 kg/m2  EXAM:  GENERAL: alert, no distress and obese  EYES: Eyes grossly normal to inspection, PERRL and conjunctivae and sclerae normal  HENT: ear canals and TM's normal, nose and mouth without ulcers or lesions  NECK: no adenopathy, no asymmetry, masses, or scars " and thyroid normal to palpation  RESP: lungs clear to auscultation - no rales, rhonchi or wheezes  BREAST: normal without masses, tenderness or nipple discharge and no palpable axillary masses or adenopathy  CV: regular rate and rhythm, normal S1 S2, no S3 or S4, no murmur, click or rub, no peripheral edema and peripheral pulses strong  ABDOMEN: soft, nontender, no hepatosplenomegaly, no masses and bowel sounds normal  MS: no gross musculoskeletal defects noted, no edema  SKIN: {:left breast-at about 2 o clock there is a small smooth papule that is skin colored, per patient has not changed  NEURO: Normal strength and tone, mentation intact and speech normal  PSYCH: mentation appears normal, affect normal/bright    ASSESSMENT/PLAN:   1. Encounter for routine adult health examination with abnormal findings      2. Major depressive disorder, recurrent episode, moderate (H)  Worsening, but patient starting taking 40 mg on her own and feels better. Will increase dose.  Recheck 1 year, sooner if needed.     - buPROPion (WELLBUTRIN SR) 100 MG 12 hr tablet; Take 1 tablet (100 mg) by mouth 2 times daily  Dispense: 180 tablet; Refill: 3  - FLUoxetine (PROZAC) 40 MG capsule; Take 1 capsule (40 mg) by mouth daily Note increase in dose  Dispense: 90 capsule; Refill: 3    3. Screen for colon cancer    - GASTROENTEROLOGY ADULT REF PROCEDURE ONLY Other; [place] [facility] [phone]    4. Skin lesion  Advised she return for shave biopsy in the future for this especially if it changes at all, could be a  BCC OR SCC    5. Acquired absence of both cervix and uterus    - estradiol (ESTRACE) 0.5 MG tablet; Take 1 tablet (0.5 mg) by mouth daily  Dispense: 90 tablet; Refill: 3    6. Surgical menopause on hormone replacement therapy    - estradiol (ESTRACE) 0.5 MG tablet; Take 1 tablet (0.5 mg) by mouth daily  Dispense: 90 tablet; Refill: 3    7. Advanced directives, counseling/discussion      8. Encounter for screening mammogram for breast  "cancer    - *MA Screening Digital Bilateral; Future    9. Hyperlipidemia with target LDL less than 130    - Lipid panel reflex to direct LDL Fasting    10. Family history of thyroid disease    - TSH with free T4 reflex    11. Screening for diabetes mellitus    - Comprehensive metabolic panel    12. Other iron deficiency anemia    - Hemoglobin    COUNSELING:   Reviewed preventive health counseling, as reflected in patient instructions       Regular exercise       Healthy diet/nutrition       Vision screening       Hearing screening    BP Screening:   Last 3 BP Readings:    BP Readings from Last 3 Encounters:   06/08/18 138/88   08/08/17 130/86   05/12/17 (!) 143/96       The following was recommended to the patient:  Re-screen BP within a year and recommended lifestyle modifications     reports that she has quit smoking. Her smoking use included Cigarettes. She started smoking about 8 years ago. She has never used smokeless tobacco.    Estimated body mass index is 39.33 kg/(m^2) as calculated from the following:    Height as of 10/6/17: 5' 5.5\" (1.664 m).    Weight as of 10/6/17: 240 lb (108.9 kg).   Weight management plan: Discussed healthy diet and exercise guidelines and patient will follow up in 12 months in clinic to re-evaluate.    Counseling Resources:  ATP IV Guidelines  Pooled Cohorts Equation Calculator  Breast Cancer Risk Calculator  FRAX Risk Assessment  ICSI Preventive Guidelines  Dietary Guidelines for Americans, 2010  USDA's MyPlate  ASA Prophylaxis  Lung CA Screening    Nallely Sanches PA-C  [facility]  "

## 2018-06-08 ENCOUNTER — OFFICE VISIT (OUTPATIENT)
Dept: FAMILY MEDICINE | Facility: CLINIC | Age: 53
End: 2018-06-08
Payer: COMMERCIAL

## 2018-06-08 VITALS
BODY MASS INDEX: 38.41 KG/M2 | SYSTOLIC BLOOD PRESSURE: 138 MMHG | DIASTOLIC BLOOD PRESSURE: 88 MMHG | RESPIRATION RATE: 18 BRPM | WEIGHT: 239 LBS | TEMPERATURE: 100.3 F | OXYGEN SATURATION: 97 % | HEART RATE: 87 BPM | HEIGHT: 66 IN

## 2018-06-08 DIAGNOSIS — L98.9 SKIN LESION: ICD-10-CM

## 2018-06-08 DIAGNOSIS — Z79.890 SURGICAL MENOPAUSE ON HORMONE REPLACEMENT THERAPY: ICD-10-CM

## 2018-06-08 DIAGNOSIS — E89.40 SURGICAL MENOPAUSE ON HORMONE REPLACEMENT THERAPY: ICD-10-CM

## 2018-06-08 DIAGNOSIS — Z13.1 SCREENING FOR DIABETES MELLITUS: ICD-10-CM

## 2018-06-08 DIAGNOSIS — Z71.89 ADVANCED DIRECTIVES, COUNSELING/DISCUSSION: ICD-10-CM

## 2018-06-08 DIAGNOSIS — Z12.31 ENCOUNTER FOR SCREENING MAMMOGRAM FOR BREAST CANCER: ICD-10-CM

## 2018-06-08 DIAGNOSIS — E78.5 HYPERLIPIDEMIA WITH TARGET LDL LESS THAN 130: ICD-10-CM

## 2018-06-08 DIAGNOSIS — Z00.01 ENCOUNTER FOR ROUTINE ADULT HEALTH EXAMINATION WITH ABNORMAL FINDINGS: Primary | ICD-10-CM

## 2018-06-08 DIAGNOSIS — Z90.710 ACQUIRED ABSENCE OF BOTH CERVIX AND UTERUS: ICD-10-CM

## 2018-06-08 DIAGNOSIS — Z83.49 FAMILY HISTORY OF THYROID DISEASE: ICD-10-CM

## 2018-06-08 DIAGNOSIS — Z12.11 SCREEN FOR COLON CANCER: ICD-10-CM

## 2018-06-08 DIAGNOSIS — D50.8 OTHER IRON DEFICIENCY ANEMIA: ICD-10-CM

## 2018-06-08 DIAGNOSIS — F33.1 MAJOR DEPRESSIVE DISORDER, RECURRENT EPISODE, MODERATE (H): ICD-10-CM

## 2018-06-08 LAB
ALBUMIN SERPL-MCNC: 3.8 G/DL (ref 3.4–5)
ALP SERPL-CCNC: 84 U/L (ref 40–150)
ALT SERPL W P-5'-P-CCNC: 24 U/L (ref 0–50)
ANION GAP SERPL CALCULATED.3IONS-SCNC: 7 MMOL/L (ref 3–14)
AST SERPL W P-5'-P-CCNC: 15 U/L (ref 0–45)
BILIRUB SERPL-MCNC: 0.3 MG/DL (ref 0.2–1.3)
BUN SERPL-MCNC: 12 MG/DL (ref 7–30)
CALCIUM SERPL-MCNC: 10.3 MG/DL (ref 8.5–10.1)
CHLORIDE SERPL-SCNC: 102 MMOL/L (ref 94–109)
CHOLEST SERPL-MCNC: 207 MG/DL
CO2 SERPL-SCNC: 29 MMOL/L (ref 20–32)
CREAT SERPL-MCNC: 0.68 MG/DL (ref 0.52–1.04)
GFR SERPL CREATININE-BSD FRML MDRD: 90 ML/MIN/1.7M2
GLUCOSE SERPL-MCNC: 91 MG/DL (ref 70–99)
HDLC SERPL-MCNC: 93 MG/DL
HGB BLD-MCNC: 13.8 G/DL (ref 11.7–15.7)
LDLC SERPL CALC-MCNC: 87 MG/DL
NONHDLC SERPL-MCNC: 114 MG/DL
POTASSIUM SERPL-SCNC: 4.3 MMOL/L (ref 3.4–5.3)
PROT SERPL-MCNC: 7.2 G/DL (ref 6.8–8.8)
SODIUM SERPL-SCNC: 138 MMOL/L (ref 133–144)
TRIGL SERPL-MCNC: 136 MG/DL
TSH SERPL DL<=0.005 MIU/L-ACNC: 0.91 MU/L (ref 0.4–4)

## 2018-06-08 PROCEDURE — 99396 PREV VISIT EST AGE 40-64: CPT | Performed by: PHYSICIAN ASSISTANT

## 2018-06-08 PROCEDURE — 36415 COLL VENOUS BLD VENIPUNCTURE: CPT | Performed by: PHYSICIAN ASSISTANT

## 2018-06-08 PROCEDURE — 80053 COMPREHEN METABOLIC PANEL: CPT | Performed by: PHYSICIAN ASSISTANT

## 2018-06-08 PROCEDURE — 84443 ASSAY THYROID STIM HORMONE: CPT | Performed by: PHYSICIAN ASSISTANT

## 2018-06-08 PROCEDURE — 85018 HEMOGLOBIN: CPT | Performed by: PHYSICIAN ASSISTANT

## 2018-06-08 PROCEDURE — 80061 LIPID PANEL: CPT | Performed by: PHYSICIAN ASSISTANT

## 2018-06-08 RX ORDER — ESTRADIOL 0.5 MG/1
0.5 TABLET ORAL DAILY
Qty: 90 TABLET | Refills: 3 | Status: SHIPPED | OUTPATIENT
Start: 2018-06-08

## 2018-06-08 RX ORDER — BUPROPION HYDROCHLORIDE 100 MG/1
100 TABLET, EXTENDED RELEASE ORAL 2 TIMES DAILY
Qty: 180 TABLET | Refills: 3 | Status: SHIPPED | OUTPATIENT
Start: 2018-06-08

## 2018-06-08 RX ORDER — FLUOXETINE 40 MG/1
40 CAPSULE ORAL DAILY
Qty: 90 CAPSULE | Refills: 3 | Status: SHIPPED | OUTPATIENT
Start: 2018-06-08

## 2018-06-08 ASSESSMENT — ANXIETY QUESTIONNAIRES
3. WORRYING TOO MUCH ABOUT DIFFERENT THINGS: NOT AT ALL
7. FEELING AFRAID AS IF SOMETHING AWFUL MIGHT HAPPEN: NOT AT ALL
1. FEELING NERVOUS, ANXIOUS, OR ON EDGE: NOT AT ALL
2. NOT BEING ABLE TO STOP OR CONTROL WORRYING: NOT AT ALL
IF YOU CHECKED OFF ANY PROBLEMS ON THIS QUESTIONNAIRE, HOW DIFFICULT HAVE THESE PROBLEMS MADE IT FOR YOU TO DO YOUR WORK, TAKE CARE OF THINGS AT HOME, OR GET ALONG WITH OTHER PEOPLE: NOT DIFFICULT AT ALL
5. BEING SO RESTLESS THAT IT IS HARD TO SIT STILL: NOT AT ALL
6. BECOMING EASILY ANNOYED OR IRRITABLE: NOT AT ALL
GAD7 TOTAL SCORE: 0

## 2018-06-08 ASSESSMENT — PATIENT HEALTH QUESTIONNAIRE - PHQ9: 5. POOR APPETITE OR OVEREATING: NOT AT ALL

## 2018-06-08 NOTE — MR AVS SNAPSHOT
After Visit Summary   6/8/2018    Narcisa Quiles    MRN: [id number]           Patient Information     Date Of Birth          1965        Visit Information        Provider Department      6/8/2018 10:20 AM Nallely Sanches PA-C [facility]        Today's Diagnoses     Encounter for screening mammogram for breast cancer    -  1    Screen for colon cancer        Screening for HIV (human immunodeficiency virus)        Major depressive disorder, recurrent episode, moderate (H)        Acquired absence of both cervix and uterus        Surgical menopause on hormone replacement therapy        Advanced directives, counseling/discussion        Hyperlipidemia with target LDL less than 130        Family history of thyroid disease        Screening for diabetes mellitus        Other iron deficiency anemia          Care Instructions      Preventive Health Recommendations  Female Ages 50 - 64    Yearly exam: See your health care provider every year in order to  o Review health changes.   o Discuss preventive care.    o Review your medicines if your doctor has prescribed any.      Get a Pap test every three years (unless you have an abnormal result and your provider advises testing more often).    If you get Pap tests with HPV test, you only need to test every 5 years, unless you have an abnormal result.     You do not need a Pap test if your uterus was removed (hysterectomy) and you have not had cancer.    You should be tested each year for STDs (sexually transmitted diseases) if you're at risk.     Have a mammogram every 1 to 2 years.    Have a colonoscopy at age 50, or have a yearly FIT test (stool test). These exams screen for colon cancer.      Have a cholesterol test every 5 years, or more often if advised.    Have a diabetes test (fasting glucose) every three years. If you are at risk for diabetes, you should have this test more often.     If you are at risk for osteoporosis  (brittle bone disease), think about having a bone density scan (DEXA).    Shots: Get a flu shot each year. Get a tetanus shot every 10 years.    Nutrition:     Eat at least 5 servings of fruits and vegetables each day.    Eat whole-grain bread, whole-wheat pasta and brown rice instead of white grains and rice.    Talk to your provider about Calcium and Vitamin D.     Lifestyle    Exercise at least 150 minutes a week (30 minutes a day, 5 days a week). This will help you control your weight and prevent disease.    Limit alcohol to one drink per day.    No smoking.     Wear sunscreen to prevent skin cancer.     See your dentist every six months for an exam and cleaning.    See your eye doctor every 1 to 2 years.            Follow-ups after your visit        Additional Services     GASTROENTEROLOGY ADULT REF PROCEDURE ONLY Other; [facility] [phone]       Last Lab Result: Creatinine (mg/dL)       Date                     Value                 06/16/2015               0.56             ----------  Body mass index is 39.17 kg/(m^2).     Needed:  No  Language:  English    Patient will be contacted to schedule procedure.     Please be aware that coverage of these services is subject to the terms and limitations of your health insurance plan.  Call member services at your health plan with any benefit or coverage questions.  Any procedures must be performed at a [facility] facility OR coordinated by your clinic's referral office.    Please bring the following with you to your appointment:    (1) Any X-Rays, CTs or MRIs which have been performed.  Contact the facility where they were done to arrange for  prior to your scheduled appointment.    (2) List of current medications   (3) This referral request   (4) Any documents/labs given to you for this referral                  Future tests that were ordered for you today     Open Future Orders        Priority Expected Expires Ordered    *MA Screening Digital  "Bilateral Routine  6/8/2019 6/8/2018            Who to contact     If you have questions or need follow up information about today's clinic visit or your schedule please contact [facility] directly at [phone].  Normal or non-critical lab and imaging results will be communicated to you by MyChart, letter or phone within 4 business days after the clinic has received the results. If you do not hear from us within 7 days, please contact the clinic through MyChart or phone. If you have a critical or abnormal lab result, we will notify you by phone as soon as possible.  Submit refill requests through [employer] or call your pharmacy and they will forward the refill request to us. Please allow 3 business days for your refill to be completed.          Additional Information About Your Visit        [employer] Information     [employer] gives you secure access to your electronic health record. If you see a primary care provider, you can also send messages to your care team and make appointments. If you have questions, please call your primary care clinic.  If you do not have a primary care provider, please call [phone] and they will assist you.        Care EveryWhere ID     This is your Care EveryWhere ID. This could be used by other organizations to access your [place] medical records  [id number]        Your Vitals Were     Pulse Temperature Respirations Height Pulse Oximetry Breastfeeding?    87 100.3  F (37.9  C) (Oral) 18 5' 5.5\" (1.664 m) 97% No    BMI (Body Mass Index)                   39.17 kg/m2            Blood Pressure from Last 3 Encounters:   06/08/18 138/88   08/08/17 130/86   05/12/17 (!) 143/96    Weight from Last 3 Encounters:   06/08/18 239 lb (108.4 kg)   10/06/17 240 lb (108.9 kg)   09/06/17 239 lb (108.4 kg)              We Performed the Following     Comprehensive metabolic panel     GASTROENTEROLOGY ADULT REF PROCEDURE ONLY Other; [facility] [phone]     Hemoglobin     Lipid panel " reflex to direct LDL Fasting     TSH with free T4 reflex          Today's Medication Changes          These changes are accurate as of 6/8/18 10:56 AM.  If you have any questions, ask your nurse or doctor.               These medicines have changed or have updated prescriptions.        Dose/Directions    FLUoxetine 40 MG capsule   Commonly known as:  PROzac   This may have changed:    - medication strength  - how much to take  - additional instructions   Used for:  Major depressive disorder, recurrent episode, moderate (H)   Changed by:  Nallely Sanches PA-C        Dose:  40 mg   Take 1 capsule (40 mg) by mouth daily Note increase in dose   Quantity:  90 capsule   Refills:  3            Where to get your medicines      These medications were sent to [id number] - [place], [place] - [address] AT [facility] [address]  [address] [place] [address], [place] [address]     Phone:  [phone]     buPROPion 100 MG 12 hr tablet    estradiol 0.5 MG tablet    FLUoxetine 40 MG capsule                Primary Care Provider Office Phone # Fax #    Shankar Pelon Parada [phone] [address] [facility] [address]        Equal Access to Services     JARED TOVAR AH: Hadii preet palacioso Soginali, waaxda luqadaha, qaybta kaalmada adeegyada, sarika rivas. So [facility] [phone].    ATENCIÓN: Si habla español, tiene a owusu disposición servicios gratuitos de asistencia lingüística. Nghia al [phone].    We comply with applicable federal civil rights laws and [place] laws. We do not discriminate on the basis of race, color, national origin, age, disability, sex, sexual orientation, or gender identity.            Thank you!     Thank you for choosing [facility]  for your care. Our goal is always to provide you with excellent care. Hearing back from our patients is one way we can continue to improve our services.  Please take a few minutes to complete the written survey that you may receive in the mail after your visit with us. Thank you!             Your Updated Medication List - Protect others around you: Learn how to safely use, store and throw away your medicines at www.disposemymeds.org.          This list is accurate as of 6/8/18 10:56 AM.  Always use your most recent med list.                   Brand Name Dispense Instructions for use Diagnosis    buPROPion 100 MG 12 hr tablet    WELLBUTRIN SR    180 tablet    Take 1 tablet (100 mg) by mouth 2 times daily    Major depressive disorder, recurrent episode, moderate (H)       estradiol 0.5 MG tablet    ESTRACE    90 tablet    Take 1 tablet (0.5 mg) by mouth daily    Acquired absence of both cervix and uterus, Surgical menopause on hormone replacement therapy       FLUoxetine 40 MG capsule    PROzac    90 capsule    Take 1 capsule (40 mg) by mouth daily Note increase in dose    Major depressive disorder, recurrent episode, moderate (H)

## 2018-06-08 NOTE — NURSING NOTE
"Chief Complaint   Patient presents with     Physical     AFE, med refill, Pt is fasting     Health Maintenance     orders pended       Initial /88  Pulse 87  Temp 100.3  F (37.9  C) (Oral)  Resp 18  Ht 5' 5.5\" (1.664 m)  Wt 239 lb (108.4 kg)  SpO2 97%  Breastfeeding? No  BMI 39.17 kg/m2 Estimated body mass index is 39.17 kg/(m^2) as calculated from the following:    Height as of this encounter: 5' 5.5\" (1.664 m).    Weight as of this encounter: 239 lb (108.4 kg).  Medication Reconciliation: complete      Aisha Leonard MA    "

## 2018-06-09 ASSESSMENT — ANXIETY QUESTIONNAIRES: GAD7 TOTAL SCORE: 0

## 2018-06-09 ASSESSMENT — PATIENT HEALTH QUESTIONNAIRE - PHQ9: SUM OF ALL RESPONSES TO PHQ QUESTIONS 1-9: 0

## 2018-06-10 NOTE — PROGRESS NOTES
Giovanni Brewster,       Your recent test results are attached, if you have any questions or concerns please feel free to contact me via e-mail or call [phone].  Cholesterol is the same. To goal. Kidney and liver function normal. No diabetes. Calcium is slightly elevated, I would recheck this in 6 months. Thyroid is normal.        It was a pleasure to see you at your recent office visit.      Sincerely,  Nallely Sanches PA-C

## 2019-01-03 ENCOUNTER — TELEPHONE (OUTPATIENT)
Dept: FAMILY MEDICINE | Facility: CLINIC | Age: 54
End: 2019-01-03

## 2019-01-03 NOTE — LETTER
Narcisa Quiles  [address]  [place] [address]      January 17, 2019      Dear Narcisa,      Our records indicate that you have not scheduled for a(n)Mammogram which was recommended by your health care team. Monitoring and managing your preventative and chronic health conditions are very important to us.       If you have received your health care elsewhere, please provide us with that information so it can be documented in your chart.    Please call [phone] or message us through your [employer] account to schedule an appointment or provide information for your chart.     We look forward to seeing you and working with you on your health care needs.     Sincerely,   TEODORA Corea /perry          *If you have already scheduled an appointment, please disregard this reminder

## 2020-03-02 ENCOUNTER — HEALTH MAINTENANCE LETTER (OUTPATIENT)
Age: 55
End: 2020-03-02

## 2020-12-20 ENCOUNTER — HEALTH MAINTENANCE LETTER (OUTPATIENT)
Age: 55
End: 2020-12-20

## 2021-04-24 ENCOUNTER — HEALTH MAINTENANCE LETTER (OUTPATIENT)
Age: 56
End: 2021-04-24

## 2021-10-03 ENCOUNTER — HEALTH MAINTENANCE LETTER (OUTPATIENT)
Age: 56
End: 2021-10-03

## 2022-03-20 ENCOUNTER — HEALTH MAINTENANCE LETTER (OUTPATIENT)
Age: 57
End: 2022-03-20

## 2022-05-15 ENCOUNTER — HEALTH MAINTENANCE LETTER (OUTPATIENT)
Age: 57
End: 2022-05-15

## 2022-09-10 ENCOUNTER — HEALTH MAINTENANCE LETTER (OUTPATIENT)
Age: 57
End: 2022-09-10

## 2023-06-03 ENCOUNTER — HEALTH MAINTENANCE LETTER (OUTPATIENT)
Age: 58
End: 2023-06-03

## 2024-06-17 PROBLEM — Z71.89 ADVANCED DIRECTIVES, COUNSELING/DISCUSSION: Status: RESOLVED | Noted: 2018-06-08 | Resolved: 2024-06-17
